# Patient Record
Sex: FEMALE | Race: WHITE | URBAN - METROPOLITAN AREA
[De-identification: names, ages, dates, MRNs, and addresses within clinical notes are randomized per-mention and may not be internally consistent; named-entity substitution may affect disease eponyms.]

---

## 2021-05-24 ENCOUNTER — OFFICE VISIT (OUTPATIENT)
Dept: FAMILY MEDICINE CLINIC | Facility: CLINIC | Age: 36
End: 2021-05-24

## 2021-05-24 VITALS
TEMPERATURE: 99 F | HEIGHT: 66.25 IN | OXYGEN SATURATION: 98 % | WEIGHT: 293 LBS | HEART RATE: 90 BPM | DIASTOLIC BLOOD PRESSURE: 94 MMHG | SYSTOLIC BLOOD PRESSURE: 144 MMHG | RESPIRATION RATE: 18 BRPM | BODY MASS INDEX: 47.09 KG/M2

## 2021-05-24 DIAGNOSIS — Z02.89 ENCOUNTER FOR PHYSICAL EXAMINATION RELATED TO EMPLOYMENT: Primary | ICD-10-CM

## 2021-05-24 DIAGNOSIS — R03.0 ELEVATED BLOOD PRESSURE READING: ICD-10-CM

## 2021-05-24 PROCEDURE — 3077F SYST BP >= 140 MM HG: CPT | Performed by: NURSE PRACTITIONER

## 2021-05-24 PROCEDURE — 3080F DIAST BP >= 90 MM HG: CPT | Performed by: NURSE PRACTITIONER

## 2021-05-24 PROCEDURE — 3008F BODY MASS INDEX DOCD: CPT | Performed by: NURSE PRACTITIONER

## 2021-05-24 PROCEDURE — 99385 PREV VISIT NEW AGE 18-39: CPT | Performed by: NURSE PRACTITIONER

## 2021-05-24 NOTE — PROGRESS NOTES
CHIEF COMPLAINT:     Patient presents with:  Employment Physical      HPI:   Claudene Pounds is a 31year old female who presents for physical exam for pre-employment as a  for [employer].      Patient denies any medical co bruits  CHEST: no chest tenderness  LUNGS: Clear to auscultation bilaterally. No diminished breath sounds. No wheezing, rhonchi, or rales. CARDIO: RRR without murmur  GI: BS's present x4., No tenderness of palpation.   No obvious masses or palpable organom

## 2021-05-24 NOTE — PATIENT INSTRUCTIONS
Establish care with a primary care doctor in the next week for further evaluation of your high blood pressure and full physical with labs. What Is High Blood Pressure?   High blood pressure (hypertension) is known as the “silent killer.” This is becau when the heart contracts. Diastolic blood pressure is the lower number. This is the pressure when the heart relaxes between beats.   Blood pressure is categorized as normal, elevated, or stage 1 or stage 2 high blood pressure:  · Normal blood pressure is sy volume and blood pressure. The American Heart Association [facility]) advises an \"ideal\" amount of sodium: no more than 1,500 mg a day.  But because Americans eat so much salt, the AHA says a positive change can occur by cutting back to even 2,300 mg a day.   · healthcare professional's instructions.

## 2023-04-18 ENCOUNTER — TELEPHONE (OUTPATIENT)
Facility: CLINIC | Age: 38
End: 2023-04-18

## 2023-11-28 ENCOUNTER — OFFICE VISIT (OUTPATIENT)
Dept: FAMILY MEDICINE CLINIC | Facility: CLINIC | Age: 38
End: 2023-11-28
Payer: COMMERCIAL

## 2023-11-28 VITALS
WEIGHT: 293 LBS | RESPIRATION RATE: 16 BRPM | OXYGEN SATURATION: 99 % | TEMPERATURE: 97 F | BODY MASS INDEX: 45.99 KG/M2 | HEART RATE: 98 BPM | SYSTOLIC BLOOD PRESSURE: 150 MMHG | HEIGHT: 67 IN | DIASTOLIC BLOOD PRESSURE: 96 MMHG

## 2023-11-28 DIAGNOSIS — R03.0 ELEVATED BLOOD PRESSURE READING: ICD-10-CM

## 2023-11-28 DIAGNOSIS — J06.9 UPPER RESPIRATORY TRACT INFECTION, UNSPECIFIED TYPE: Primary | ICD-10-CM

## 2023-11-28 PROCEDURE — 3008F BODY MASS INDEX DOCD: CPT | Performed by: NURSE PRACTITIONER

## 2023-11-28 PROCEDURE — 99213 OFFICE O/P EST LOW 20 MIN: CPT | Performed by: NURSE PRACTITIONER

## 2023-11-28 PROCEDURE — 3077F SYST BP >= 140 MM HG: CPT | Performed by: NURSE PRACTITIONER

## 2023-11-28 PROCEDURE — 3080F DIAST BP >= 90 MM HG: CPT | Performed by: NURSE PRACTITIONER

## 2024-05-06 ENCOUNTER — OFFICE VISIT (OUTPATIENT)
Dept: FAMILY MEDICINE CLINIC | Facility: CLINIC | Age: 39
End: 2024-05-06
Payer: COMMERCIAL

## 2024-05-06 VITALS
DIASTOLIC BLOOD PRESSURE: 96 MMHG | WEIGHT: 293 LBS | OXYGEN SATURATION: 98 % | BODY MASS INDEX: 45.99 KG/M2 | RESPIRATION RATE: 18 BRPM | HEIGHT: 67 IN | HEART RATE: 84 BPM | SYSTOLIC BLOOD PRESSURE: 154 MMHG | TEMPERATURE: 99 F

## 2024-05-06 DIAGNOSIS — G47.30 SLEEP DISORDER BREATHING: ICD-10-CM

## 2024-05-06 DIAGNOSIS — Z30.432 ENCOUNTER FOR IUD REMOVAL: ICD-10-CM

## 2024-05-06 DIAGNOSIS — Z23 NEED FOR DIPHTHERIA-TETANUS-PERTUSSIS (TDAP) VACCINE: ICD-10-CM

## 2024-05-06 DIAGNOSIS — Z00.00 WELL ADULT EXAM: Primary | ICD-10-CM

## 2024-05-06 DIAGNOSIS — R01.2 ABNORMAL SECOND HEART SOUND (S2): ICD-10-CM

## 2024-05-06 DIAGNOSIS — Z01.419 CERVICAL SMEAR, AS PART OF ROUTINE GYNECOLOGICAL EXAMINATION: ICD-10-CM

## 2024-05-06 DIAGNOSIS — I10 BENIGN ESSENTIAL HTN: ICD-10-CM

## 2024-05-06 DIAGNOSIS — E66.01 CLASS 3 SEVERE OBESITY DUE TO EXCESS CALORIES WITH BODY MASS INDEX (BMI) OF 45.0 TO 49.9 IN ADULT, UNSPECIFIED WHETHER SERIOUS COMORBIDITY PRESENT (HCC): ICD-10-CM

## 2024-05-06 LAB
ALBUMIN SERPL-MCNC: 3.9 G/DL (ref 3.4–5)
ALBUMIN/GLOB SERPL: 1 {RATIO} (ref 1–2)
ALP LIVER SERPL-CCNC: 61 U/L
ALT SERPL-CCNC: 35 U/L
ANION GAP SERPL CALC-SCNC: 5 MMOL/L (ref 0–18)
AST SERPL-CCNC: 30 U/L (ref 15–37)
ATRIAL RATE: 78 BPM
BASOPHILS # BLD AUTO: 0.06 X10(3) UL (ref 0–0.2)
BASOPHILS NFR BLD AUTO: 0.7 %
BILIRUB SERPL-MCNC: 0.4 MG/DL (ref 0.1–2)
BUN BLD-MCNC: 12 MG/DL (ref 9–23)
CALCIUM BLD-MCNC: 9.8 MG/DL (ref 8.5–10.1)
CHLORIDE SERPL-SCNC: 106 MMOL/L (ref 98–112)
CHOLEST SERPL-MCNC: 248 MG/DL (ref ?–200)
CO2 SERPL-SCNC: 28 MMOL/L (ref 21–32)
CREAT BLD-MCNC: 0.82 MG/DL
CREAT UR-SCNC: 295 MG/DL
EGFRCR SERPLBLD CKD-EPI 2021: 94 ML/MIN/1.73M2 (ref 60–?)
EOSINOPHIL # BLD AUTO: 0.21 X10(3) UL (ref 0–0.7)
EOSINOPHIL NFR BLD AUTO: 2.5 %
ERYTHROCYTE [DISTWIDTH] IN BLOOD BY AUTOMATED COUNT: 13 %
EST. AVERAGE GLUCOSE BLD GHB EST-MCNC: 134 MG/DL (ref 68–126)
FASTING PATIENT LIPID ANSWER: YES
FASTING STATUS PATIENT QL REPORTED: YES
GLOBULIN PLAS-MCNC: 3.8 G/DL (ref 2.8–4.4)
GLUCOSE BLD-MCNC: 117 MG/DL (ref 70–99)
HBA1C MFR BLD: 6.3 % (ref ?–5.7)
HCT VFR BLD AUTO: 41.8 %
HDLC SERPL-MCNC: 44 MG/DL (ref 40–59)
HGB BLD-MCNC: 14.7 G/DL
IMM GRANULOCYTES # BLD AUTO: 0.05 X10(3) UL (ref 0–1)
IMM GRANULOCYTES NFR BLD: 0.6 %
LDLC SERPL CALC-MCNC: 146 MG/DL (ref ?–100)
LYMPHOCYTES # BLD AUTO: 2.13 X10(3) UL (ref 1–4)
LYMPHOCYTES NFR BLD AUTO: 25.7 %
MCH RBC QN AUTO: 30.7 PG (ref 26–34)
MCHC RBC AUTO-ENTMCNC: 35.2 G/DL (ref 31–37)
MCV RBC AUTO: 87.3 FL
MICROALBUMIN UR-MCNC: 227 MG/DL
MICROALBUMIN/CREAT 24H UR-RTO: 769.5 UG/MG (ref ?–30)
MONOCYTES # BLD AUTO: 0.51 X10(3) UL (ref 0.1–1)
MONOCYTES NFR BLD AUTO: 6.1 %
NEUTROPHILS # BLD AUTO: 5.34 X10 (3) UL (ref 1.5–7.7)
NEUTROPHILS # BLD AUTO: 5.34 X10(3) UL (ref 1.5–7.7)
NEUTROPHILS NFR BLD AUTO: 64.4 %
NONHDLC SERPL-MCNC: 204 MG/DL (ref ?–130)
OSMOLALITY SERPL CALC.SUM OF ELEC: 289 MOSM/KG (ref 275–295)
P AXIS: 39 DEGREES
P-R INTERVAL: 150 MS
PLATELET # BLD AUTO: 273 10(3)UL (ref 150–450)
POTASSIUM SERPL-SCNC: 4.1 MMOL/L (ref 3.5–5.1)
PROT SERPL-MCNC: 7.7 G/DL (ref 6.4–8.2)
Q-T INTERVAL: 386 MS
QRS DURATION: 86 MS
QTC CALCULATION (BEZET): 440 MS
R AXIS: 15 DEGREES
RBC # BLD AUTO: 4.79 X10(6)UL
SODIUM SERPL-SCNC: 139 MMOL/L (ref 136–145)
T AXIS: 25 DEGREES
T4 FREE SERPL-MCNC: 0.7 NG/DL (ref 0.8–1.7)
TRIGL SERPL-MCNC: 314 MG/DL (ref 30–149)
TSI SER-ACNC: 2.32 MIU/ML (ref 0.36–3.74)
VENTRICULAR RATE: 78 BPM
VLDLC SERPL CALC-MCNC: 60 MG/DL (ref 0–30)
WBC # BLD AUTO: 8.3 X10(3) UL (ref 4–11)

## 2024-05-06 PROCEDURE — 80061 LIPID PANEL: CPT | Performed by: FAMILY MEDICINE

## 2024-05-06 PROCEDURE — 84443 ASSAY THYROID STIM HORMONE: CPT | Performed by: FAMILY MEDICINE

## 2024-05-06 PROCEDURE — 84439 ASSAY OF FREE THYROXINE: CPT | Performed by: FAMILY MEDICINE

## 2024-05-06 PROCEDURE — 85025 COMPLETE CBC W/AUTO DIFF WBC: CPT | Performed by: FAMILY MEDICINE

## 2024-05-06 PROCEDURE — 83036 HEMOGLOBIN GLYCOSYLATED A1C: CPT | Performed by: FAMILY MEDICINE

## 2024-05-06 PROCEDURE — 82043 UR ALBUMIN QUANTITATIVE: CPT | Performed by: FAMILY MEDICINE

## 2024-05-06 PROCEDURE — 82570 ASSAY OF URINE CREATININE: CPT | Performed by: FAMILY MEDICINE

## 2024-05-06 PROCEDURE — 80053 COMPREHEN METABOLIC PANEL: CPT | Performed by: FAMILY MEDICINE

## 2024-05-06 RX ORDER — LISINOPRIL 10 MG/1
10 TABLET ORAL DAILY
Qty: 30 TABLET | Refills: 0 | Status: SHIPPED | OUTPATIENT
Start: 2024-05-06 | End: 2025-05-01

## 2024-05-06 NOTE — PROGRESS NOTES
Chief Complaint   Patient presents with    Care Gap Management     Annual px  Pap smear  Tdap    Well Adult     Pt would like to discuss blood pressure, weight management, women's health, pain in right arm that's been occurring for a month now, and IUD that was placed in 2012, Pt is fasting         HPI  Patient is here to establish care.  She has not had a physical in several years and she has an IUD that was placed in 2012 and she requested gynecological referral to receive a Pap and have this removed.  She is in a single sex relationship and no longer requires birth control.    Patient also struggling with her blood pressure and weight and is agreeable to getting EKG and blood work today as she is fasting. She agrees to Tdap and for weight mmgt referral    She dneis CP/SOB/N/V/constipation or diarrhea. She denies tingling numbness joint pain or weakness.  She does complain of nonrestorative sleep and sleep disordered breathing.  Her partner is having to sleep away from her due to her snoring    ROS  As per HPI and all other systems reviewed and are negative      No past medical history on file.    Past Surgical History:   Procedure Laterality Date    Tonsillectomy         Social History     Socioeconomic History    Marital status:    Tobacco Use    Smoking status: Former     Types: Cigarettes    Smokeless tobacco: Never    Tobacco comments:     Quit 10 years ago   Substance and Sexual Activity    Alcohol use: Yes     Comment: socially 1x/ month    Drug use: Never       No family history on file.     No current outpatient medications on file prior to visit.     No current facility-administered medications on file prior to visit.         Objective  Vitals:    05/06/24 0721   BP: (!) 154/96   Pulse: 84   Resp: 18   Temp: 98.6 °F (37 °C)   SpO2: 98%   Weight: (!) 319 lb 6.4 oz (144.9 kg)   Height: 5' 7\" (1.702 m)     Physical Exam  Constitutional:       Appearance: Normal appearance.   HEENT:      Head:  Normocephalic and atraumatic.      Eyes: PERRLA no notable nystagmus     Ears: normal on observation     Nose: Nose normal.      Mouth: Mucous membranes are moist.      Neck: no masses no bruit  Cardiovascular:      Rate and Rhythm: Normal rate and regular rhythm.   Pulmonary:      Effort: Pulmonary effort is normal.      Breath sounds: Normal breath sounds.   Abdominal:      General: Bowel sounds are normal.      Palpations: Abdomen is soft. There is no mass.   Musculoskeletal:         General: Normal range of motion.      Cervical back: Normal range of motion.   Skin:     General: Skin is warm and dry.   Neurological:      General: No focal deficit present.      Mental Status: She is alert and oriented to person, place, and time.   Psychiatric:         Mood and Affect: Mood normal.         Thought Content: Thought content normal.       Assessment and Plan  Samara was seen today for care gap management and well adult.    Diagnoses and all orders for this visit:    Well adult exam  -     CBC With Differential With Platelet; Future  -     Comp Metabolic Panel (14); Future  -     TSH and Free T4; Future  -     Lipid Panel; Future  -     CBC With Differential With Platelet  -     Comp Metabolic Panel (14)  -     TSH and Free T4  -     Lipid Panel    Class 3 severe obesity due to excess calories with body mass index (BMI) of 45.0 to 49.9 in adult, unspecified whether serious comorbidity present (HCC)  -     Hemoglobin A1C [E]; Future  -     [facility] - [place] Location  -     Hemoglobin A1C [E]    Need for diphtheria-tetanus-pertussis (Tdap) vaccine  -     TdaP (Adacel, Boostrix) [17410]    Benign essential HTN  -     Microalb/Creat Ratio, Random Urine [E]; Future  -     EKG with interpretation and Report -IN OFFICE [93145]  -     Microalb/Creat Ratio, Random Urine [E]  -     CARD ECHO 2D DOPPLER (CPT=93306); Future  -     lisinopril 10 MG Oral Tab; Take 1 tablet (10 mg total) by mouth  daily.    Encounter for IUD removal  -     OBG Referral - [place] ([place])    Cervical smear, as part of routine gynecological examination  -     [facility]G Referral - [place] ([facility])    Sleep disorder breathing  -     Sleep Consult Only - Internal ([facility])    Abnormal second heart sound (S2)  -     CARD ECHO 2D DOPPLER (CPT=93306); Future           Follow up  3 weeks for BP check and repeat BMP      Patient Instructions  There are no Patient Instructions on file for this visit.       Belinda Day MD

## 2024-05-07 ENCOUNTER — TELEPHONE (OUTPATIENT)
Dept: FAMILY MEDICINE CLINIC | Facility: CLINIC | Age: 39
End: 2024-05-07

## 2024-05-07 NOTE — TELEPHONE ENCOUNTER
Attempted to contact patient, unable to reach her- left a voicemail per her verbal.     In order to request her medical records for a pap, our office needs to know which offices specifically she visited and their phone numbers. I instructed the patient to call our office at [phone].    Currently awaiting return call.

## 2024-05-08 ENCOUNTER — TELEMEDICINE (OUTPATIENT)
Dept: FAMILY MEDICINE CLINIC | Facility: CLINIC | Age: 39
End: 2024-05-08
Payer: COMMERCIAL

## 2024-05-08 DIAGNOSIS — E66.01 CLASS 3 SEVERE OBESITY DUE TO EXCESS CALORIES WITH BODY MASS INDEX (BMI) OF 45.0 TO 49.9 IN ADULT, UNSPECIFIED WHETHER SERIOUS COMORBIDITY PRESENT (HCC): ICD-10-CM

## 2024-05-08 DIAGNOSIS — E78.2 MIXED HYPERLIPIDEMIA: ICD-10-CM

## 2024-05-08 DIAGNOSIS — R73.02 IGT (IMPAIRED GLUCOSE TOLERANCE): ICD-10-CM

## 2024-05-08 DIAGNOSIS — I10 BENIGN ESSENTIAL HTN: Primary | ICD-10-CM

## 2024-05-08 PROCEDURE — 99214 OFFICE O/P EST MOD 30 MIN: CPT | Performed by: FAMILY MEDICINE

## 2024-05-08 RX ORDER — METFORMIN HYDROCHLORIDE 500 MG/1
500 TABLET, EXTENDED RELEASE ORAL DAILY
Qty: 30 TABLET | Refills: 2 | Status: SHIPPED | OUTPATIENT
Start: 2024-05-08

## 2024-05-08 RX ORDER — ATORVASTATIN CALCIUM 10 MG/1
10 TABLET, FILM COATED ORAL DAILY
Qty: 30 TABLET | Refills: 2 | Status: SHIPPED | OUTPATIENT
Start: 2024-05-08 | End: 2025-05-03

## 2024-05-08 RX ORDER — BLOOD PRESSURE TEST KIT
KIT MISCELLANEOUS
Qty: 1 KIT | Refills: 0 | Status: SHIPPED | OUTPATIENT
Start: 2024-05-08

## 2024-05-08 NOTE — PROGRESS NOTES
Telehealth outside of [facility]  Telehealth Verbal Consent   I conducted a telehealth visit with Samara Jade today, 05/08/24, which was completed using two-way, real-time interactive audio and video communication. This has been done in good james to provide continuity of care in the best interest of the provider-patient relationship, due to the COVID -19 public health crisis/national emergency where restrictions of face-to-face office visits are ongoing. Every conscious effort was taken to allow for sufficient and adequate time to complete the visit.  The patient was made aware of the limitations of the telehealth visit, including treatment limitations as no physical exam could be performed.  The patient was advised to call 911 or to go to the ER in case there was an emergency.  The patient was also advised of the potential privacy & security concerns related to the telehealth platform.   The patient was made aware of where to find [facility]'s notice of privacy practices, telehealth consent form and other related consent forms and documents.  which are located on the [facility] website. The patient verbally agreed to telehealth consent form, related consents and the risks discussed.    Lastly, the patient confirmed that they were in [place].   Included in this visit, time may have been spent reviewing labs, medications, radiology tests and decision making. Appropriate medical decision-making and tests are ordered as detailed in the plan of care above.  Coding/billing information is submitted for this visit based on complexity of care and/or time spent for the visit.    Samara Jade is a 39 year old female.    Chief Complaint   Patient presents with    Follow - Up     Labs         HPI:   Patient is here for follow up of recent labs, Informed of elevated lipid and prediabetes as well as microalbuminuria. She has not been monitoring her BP but will do so. She is agreeable to starting statin therapy and metformin to  help with glucose mmgt and weight. She is also agreeable to referral to dietician for obesity mmgt. Meds to be started 2 weeks apart and pt to review med SE and call with questions     There is no problem list on file for this patient.    Current Outpatient Medications   Medication Sig Dispense Refill    Blood Pressure Does not apply Kit Use daily as needed 1 kit 0    metFORMIN  MG Oral Tablet 24 Hr Take 1 tablet (500 mg total) by mouth daily. 30 tablet 2    atorvastatin (LIPITOR) 10 MG Oral Tab Take 1 tablet (10 mg total) by mouth daily. 30 tablet 2    lisinopril 10 MG Oral Tab Take 1 tablet (10 mg total) by mouth daily. 30 tablet 0      No past medical history on file.   Social History:  Social History     Socioeconomic History    Marital status:    Tobacco Use    Smoking status: Former     Types: Cigarettes    Smokeless tobacco: Never    Tobacco comments:     Quit 10 years ago   Substance and Sexual Activity    Alcohol use: Yes     Comment: socially 1x/ month    Drug use: Never     No family history on file.     Allergies  Allergies   Allergen Reactions    Sulfa Antibiotics OTHER (SEE COMMENTS)     Pt stated her mother had told her this when she was young and is unaware if this allergy is true       REVIEW OF SYSTEMS:   As per HPI all other systems are negative    EXAM:   GENERAL: well developed, well nourished,in no apparent distress. Pt is speaking in full sentences without any cognitive impairment. Further physical exam could not be performed due to interview being conducted over telemedicine medium      ASSESSMENT AND PLAN:     Encounter Diagnoses   Name Primary?    Benign essential HTN Yes    IGT (impaired glucose tolerance)     Mixed hyperlipidemia     Class 3 severe obesity due to excess calories with body mass index (BMI) of 45.0 to 49.9 in adult, unspecified whether serious comorbidity present (HCC)        No orders of the defined types were placed in this encounter.      Meds & Refills for  this Visit:  Requested Prescriptions     Signed Prescriptions Disp Refills    Blood Pressure Does not apply Kit 1 kit 0     Sig: Use daily as needed    metFORMIN  MG Oral Tablet 24 Hr 30 tablet 2     Sig: Take 1 tablet (500 mg total) by mouth daily.    atorvastatin (LIPITOR) 10 MG Oral Tab 30 tablet 2     Sig: Take 1 tablet (10 mg total) by mouth daily.       Imaging & Consults:  DIETITIAN EDUCATION INITIAL, DIET (INTERNAL)    Return in about 3 months (around 8/8/2024) for labs and med mmgt.  There are no Patient Instructions on file for this visit.

## 2024-05-28 ENCOUNTER — OFFICE VISIT (OUTPATIENT)
Dept: FAMILY MEDICINE CLINIC | Facility: CLINIC | Age: 39
End: 2024-05-28

## 2024-05-28 VITALS
BODY MASS INDEX: 50 KG/M2 | OXYGEN SATURATION: 97 % | HEART RATE: 84 BPM | DIASTOLIC BLOOD PRESSURE: 84 MMHG | TEMPERATURE: 98 F | WEIGHT: 293 LBS | SYSTOLIC BLOOD PRESSURE: 142 MMHG | RESPIRATION RATE: 22 BRPM

## 2024-05-28 DIAGNOSIS — I10 BENIGN ESSENTIAL HTN: Primary | ICD-10-CM

## 2024-05-28 LAB
ANION GAP SERPL CALC-SCNC: 6 MMOL/L (ref 0–18)
BUN BLD-MCNC: 12 MG/DL (ref 9–23)
CALCIUM BLD-MCNC: 9.5 MG/DL (ref 8.5–10.1)
CHLORIDE SERPL-SCNC: 106 MMOL/L (ref 98–112)
CO2 SERPL-SCNC: 25 MMOL/L (ref 21–32)
CREAT BLD-MCNC: 0.96 MG/DL
CREAT UR-SCNC: 242 MG/DL
EGFRCR SERPLBLD CKD-EPI 2021: 78 ML/MIN/1.73M2 (ref 60–?)
FASTING STATUS PATIENT QL REPORTED: NO
GLUCOSE BLD-MCNC: 105 MG/DL (ref 70–99)
MICROALBUMIN UR-MCNC: 141 MG/DL
MICROALBUMIN/CREAT 24H UR-RTO: 582.6 UG/MG (ref ?–30)
OSMOLALITY SERPL CALC.SUM OF ELEC: 284 MOSM/KG (ref 275–295)
POTASSIUM SERPL-SCNC: 4.2 MMOL/L (ref 3.5–5.1)
SODIUM SERPL-SCNC: 137 MMOL/L (ref 136–145)

## 2024-05-28 PROCEDURE — 82570 ASSAY OF URINE CREATININE: CPT | Performed by: FAMILY MEDICINE

## 2024-05-28 PROCEDURE — 82043 UR ALBUMIN QUANTITATIVE: CPT | Performed by: FAMILY MEDICINE

## 2024-05-28 PROCEDURE — 3079F DIAST BP 80-89 MM HG: CPT | Performed by: FAMILY MEDICINE

## 2024-05-28 PROCEDURE — 80048 BASIC METABOLIC PNL TOTAL CA: CPT | Performed by: FAMILY MEDICINE

## 2024-05-28 PROCEDURE — 99213 OFFICE O/P EST LOW 20 MIN: CPT | Performed by: FAMILY MEDICINE

## 2024-05-28 PROCEDURE — 3077F SYST BP >= 140 MM HG: CPT | Performed by: FAMILY MEDICINE

## 2024-05-28 RX ORDER — LISINOPRIL 20 MG/1
20 TABLET ORAL DAILY
Qty: 30 TABLET | Refills: 0 | Status: SHIPPED | OUTPATIENT
Start: 2024-05-28 | End: 2025-05-23

## 2024-05-28 NOTE — PROGRESS NOTES
Chief Complaint   Patient presents with    Follow - Up     4W follow up to check blood pressure.         HPI  Pt is here for follow up on medication and BP. She has no concerns and her BP is better but still high. She is agreeable to increasing the dose of lisinopril and is being more active    ROS  As per HPI and all other systems reviewed and are negative      History reviewed. No pertinent past medical history.    Past Surgical History:   Procedure Laterality Date    Tonsillectomy         Social History     Socioeconomic History    Marital status:    Tobacco Use    Smoking status: Former     Types: Cigarettes    Smokeless tobacco: Never    Tobacco comments:     Quit 10 years ago   Vaping Use    Vaping status: Former   Substance and Sexual Activity    Alcohol use: Yes     Comment: socially 1x/ month    Drug use: Never       History reviewed. No pertinent family history.     Current Outpatient Medications on File Prior to Visit   Medication Sig Dispense Refill    Blood Pressure Does not apply Kit Use daily as needed 1 kit 0    metFORMIN  MG Oral Tablet 24 Hr Take 1 tablet (500 mg total) by mouth daily. 30 tablet 2    atorvastatin (LIPITOR) 10 MG Oral Tab Take 1 tablet (10 mg total) by mouth daily. 30 tablet 2     No current facility-administered medications on file prior to visit.         Objective  Vitals:    05/28/24 0734   BP: 142/84   Pulse: 84   Resp: 22   Temp: 97.9 °F (36.6 °C)   SpO2: 97%   Weight: (!) 319 lb (144.7 kg)     Physical Exam  Constitutional:       Appearance: Normal appearance.   HEENT:      Head: Normocephalic and atraumatic.      Eyes: PERRLA no notable nystagmus     Ears: normal on observation     Nose: Nose normal.      Mouth: Mucous membranes are moist.      Neck: no masses no bruit  Cardiovascular:      Rate and Rhythm: Normal rate and regular rhythm.   Pulmonary:      Effort: Pulmonary effort is normal.      Breath sounds: Normal breath sounds.   Abdominal:      General:  Bowel sounds are normal.      Palpations: Abdomen is soft. There is no mass.   Musculoskeletal:         General: Normal range of motion.      Cervical back: Normal range of motion.   Skin:     General: Skin is warm and dry.   Neurological:      General: No focal deficit present.      Mental Status: She is alert and oriented to person, place, and time.   Psychiatric:         Mood and Affect: Mood normal.         Thought Content: Thought content normal.       Assessment and Plan  Samara was seen today for follow - up.    Diagnoses and all orders for this visit:    Benign essential HTN  -     lisinopril 20 MG Oral Tab; Take 1 tablet (20 mg total) by mouth daily.  -     Basic Metabolic Panel (8) [E]; Future  -     Microalb/Creat Ratio, Random Urine [E]; Future  -     Basic Metabolic Panel (8) [E]  -     Microalb/Creat Ratio, Random Urine [E]           Follow up  Return in about 3 months (around 8/28/2024) for well ness with labs.      Patient Instructions  There are no Patient Instructions on file for this visit.       Belinda Day MD

## 2024-06-15 DIAGNOSIS — I10 BENIGN ESSENTIAL HTN: ICD-10-CM

## 2024-06-15 NOTE — TELEPHONE ENCOUNTER
Pt failed refill protocol for the following reasons:    Hypertension Medications Protocol Vfsegy63/15/2024 07:47 AM   Protocol Details Last BP reading less than 140/90        *Please see message from patient*    Last refill: 5/28/2024 #30 with 0 refills  Last appt: 5/28/2024  Next appt:   Future Appointments   Date Time Provider Department Center   8/28/2024  7:20 AM Belinda Day MD [facility] [place]         Forward to Dr. Cano, please advise on refills. Thank you.

## 2024-06-17 RX ORDER — LISINOPRIL 20 MG/1
20 TABLET ORAL DAILY
Qty: 30 TABLET | Refills: 0 | Status: SHIPPED | OUTPATIENT
Start: 2024-06-17 | End: 2025-06-12

## 2024-06-19 DIAGNOSIS — I10 BENIGN ESSENTIAL HTN: ICD-10-CM

## 2024-06-19 RX ORDER — LISINOPRIL 20 MG/1
20 TABLET ORAL DAILY
Qty: 30 TABLET | Refills: 0 | Status: SHIPPED | OUTPATIENT
Start: 2024-06-19

## 2024-06-19 NOTE — TELEPHONE ENCOUNTER
Rx resent    Sent to pharmacy as: Lisinopril 20 MG Oral Tablet (Prinivil; Zestril)    E-Prescribing Status: Receipt confirmed by pharmacy (6/17/2024  9:47 AM CDT)

## 2024-07-31 DIAGNOSIS — E78.2 MIXED HYPERLIPIDEMIA: ICD-10-CM

## 2024-07-31 DIAGNOSIS — R73.02 IGT (IMPAIRED GLUCOSE TOLERANCE): ICD-10-CM

## 2024-07-31 RX ORDER — ATORVASTATIN CALCIUM 10 MG/1
10 TABLET, FILM COATED ORAL DAILY
Qty: 30 TABLET | Refills: 0 | Status: SHIPPED | OUTPATIENT
Start: 2024-07-31

## 2024-07-31 RX ORDER — METFORMIN HYDROCHLORIDE 500 MG/1
500 TABLET, EXTENDED RELEASE ORAL DAILY
Qty: 30 TABLET | Refills: 0 | Status: SHIPPED | OUTPATIENT
Start: 2024-07-31

## 2024-07-31 NOTE — TELEPHONE ENCOUNTER
Diabetes Medication Protocol Yjywue3007/31/2024 05:32 AM   Protocol Details Last A1C < 7.5 and within past 6 months    In person appointment or virtual visit in the past 6 mos or appointment in next 3 mos    Microalbumin procedure in past 12 months or taking ACE/ARB    EGFRCR or GFRNAA > 50    GFR in the past 12 months      Refilled per protocol  metFORMIN  MG Oral Tablet 24 Hr   Last refilled on 5/8/24 #30 with 2 rf.  LOV- 5/28/24  Last labs- 5/28/24    Sent to pharmacy    Refilled per protocol  atorvastatin (LIPITOR) 10 MG Oral Tab   Last refilled on 5/8/24 #30 with 2 rf.  Sent to pharmacy

## 2024-08-14 DIAGNOSIS — R73.02 IGT (IMPAIRED GLUCOSE TOLERANCE): ICD-10-CM

## 2024-08-14 DIAGNOSIS — I10 BENIGN ESSENTIAL HTN: ICD-10-CM

## 2024-08-14 DIAGNOSIS — E78.2 MIXED HYPERLIPIDEMIA: ICD-10-CM

## 2024-08-14 RX ORDER — LISINOPRIL 20 MG/1
20 TABLET ORAL DAILY
Qty: 30 TABLET | Refills: 0 | Status: SHIPPED | OUTPATIENT
Start: 2024-08-14 | End: 2024-08-14

## 2024-08-14 RX ORDER — LISINOPRIL 20 MG/1
20 TABLET ORAL DAILY
Qty: 30 TABLET | Refills: 0 | Status: SHIPPED | OUTPATIENT
Start: 2024-08-14

## 2024-08-14 RX ORDER — ATORVASTATIN CALCIUM 10 MG/1
10 TABLET, FILM COATED ORAL DAILY
Qty: 30 TABLET | Refills: 0 | Status: SHIPPED | OUTPATIENT
Start: 2024-08-14

## 2024-08-14 RX ORDER — METFORMIN HYDROCHLORIDE 500 MG/1
500 TABLET, EXTENDED RELEASE ORAL DAILY
Qty: 30 TABLET | Refills: 0 | Status: SHIPPED | OUTPATIENT
Start: 2024-08-14

## 2024-08-14 NOTE — TELEPHONE ENCOUNTER
Diabetes Medication Protocol Vupgfx6908/14/2024 09:14 AM   Protocol Details Last A1C < 7.5 and within past 6 months    In person appointment or virtual visit in the past 6 mos or appointment in next 3 mos    Microalbumin procedure in past 12 months or taking ACE/ARB    EGFRCR or GFRNAA > 50    GFR in the past 12 months   Refilled per protocol  ATORVASTATIN 10 MG Oral Tab   Last refilled on 7/31/24 #30 with 0 rf.  LOV- 5/28/24  Last wellness-5/6/24  Last labs- 5/28/24    Sent to provider.     Refilled per protocol  METFORMIN  MG Oral Tablet 24 Hr   Last refilled on 7/31/24 #30 with 0 rf.  Sent to provider.     Routing to provider per protocol.   LISINOPRIL 20 MG Oral Tab   Last refilled on 8/14/24 for #30  with 0 rf.     Future Appointments   Date Time Provider Department Center   8/28/2024  7:20 AM Belinda Day MD [facility] [place]          Thank you.

## 2024-08-14 NOTE — TELEPHONE ENCOUNTER
Hypertension Medications Protocol Ffcpva6208/14/2024 09:13 AM   Protocol Details Last BP reading less than 140/90    CMP or BMP in past 12 months    In person appointment or virtual visit in the past 12 mos or appointment in next 3 mos    EGFRCR or GFRNAA > 50      Routing to provider per protocol.   lisinopril 20 MG Oral Tab   Last refilled on 6/19/24 for #30  with 0 rf.   Last labs 5/28/24.   Last seen on 5/28/24  Last wellness-5/6/24.       Future Appointments   Date Time Provider Department Center   8/28/2024  7:20 AM Belinda Day MD [facility] [place]          Thank you.

## 2024-08-26 ENCOUNTER — TELEPHONE (OUTPATIENT)
Dept: FAMILY MEDICINE CLINIC | Facility: CLINIC | Age: 39
End: 2024-08-26

## 2024-08-26 DIAGNOSIS — I10 BENIGN ESSENTIAL HTN: Primary | ICD-10-CM

## 2024-08-26 NOTE — TELEPHONE ENCOUNTER
Per Dr. Cano: Ok to wait to see me as long as BP ok and she is doing ok. Thank you     Improvement in proteinuria. Repeat again once BP under control   Written by Belinda Day MD on 5/29/2024  7:47 AM CDT  Seen by patient Samara Jade on 8/11/2024  6:26 PM    Future Appointments   Date Time Provider Department Center   8/30/2024  8:45 AM [facility] [place]     Microalb urine order placed    Left detailed message to voicemail (per verbal release form consent with confirmed identifying message) of Dr. Cano's note above, advised will repeat microalb urine if BP WNL. Patient was advised to call office back with any questions/concerns.

## 2024-08-26 NOTE — TELEPHONE ENCOUNTER
Patient was scheduled for a visit on Wednesday to follow up on labs and blood pressure    She is unable to reschedule due to work schedule for a few weeks    She did schedule a nurse visit on Friday to have blood pressure checked for now    She wanted pcp to be aware    Please adv  Thank you

## 2024-08-30 ENCOUNTER — NURSE ONLY (OUTPATIENT)
Dept: FAMILY MEDICINE CLINIC | Facility: CLINIC | Age: 39
End: 2024-08-30
Payer: COMMERCIAL

## 2024-08-30 VITALS — DIASTOLIC BLOOD PRESSURE: 80 MMHG | SYSTOLIC BLOOD PRESSURE: 120 MMHG

## 2024-08-30 DIAGNOSIS — I10 BENIGN ESSENTIAL HTN: ICD-10-CM

## 2024-08-30 LAB
CREAT UR-SCNC: 130.9 MG/DL
MICROALBUMIN UR-MCNC: 87.9 MG/DL
MICROALBUMIN/CREAT 24H UR-RTO: 671.5 UG/MG (ref ?–30)

## 2024-08-30 PROCEDURE — 3079F DIAST BP 80-89 MM HG: CPT | Performed by: FAMILY MEDICINE

## 2024-08-30 PROCEDURE — 3074F SYST BP LT 130 MM HG: CPT | Performed by: FAMILY MEDICINE

## 2024-08-30 PROCEDURE — 82570 ASSAY OF URINE CREATININE: CPT | Performed by: FAMILY MEDICINE

## 2024-08-30 PROCEDURE — 82043 UR ALBUMIN QUANTITATIVE: CPT | Performed by: FAMILY MEDICINE

## 2024-08-30 RX ORDER — LISINOPRIL 20 MG/1
20 TABLET ORAL DAILY
Qty: 90 TABLET | Refills: 0 | Status: SHIPPED | OUTPATIENT
Start: 2024-08-30 | End: 2024-11-28

## 2024-08-30 NOTE — PROGRESS NOTES
Samara Jade present in office for nurse visit.  BP check  Urine sample obtained    Pt reports taking lisinopril 20 mg once daily  Takes BP with wrist BP machine at home, BPs running 130s/80s    Pt sitting, manual BP check  Right arm: 120/80     Pt needing refill for lisinopril   Pt to reschedule follow-up with Dr. Cano as appt on 08/28/24 needed to be rescheduled    All questions/concerns addressed. Patient left in stable condition.

## 2024-08-30 NOTE — TELEPHONE ENCOUNTER
Please see Nurse Visit appt for BP check 08/30/24    BP Readings from Last 3 Encounters:   08/30/24 120/80   05/28/24 142/84   05/06/24 (!) 154/96     LOV 05/28/24  Last well adult 05/06/24  Last labs 05/28/24  Last refill on 08/14/24, for #30 tabs, with 0 refills  lisinopril 20 MG Oral Tab     No future appointments.    Order(s) pending, please review. Thank you.  [facility] [place]

## 2024-09-05 ENCOUNTER — PATIENT MESSAGE (OUTPATIENT)
Dept: FAMILY MEDICINE CLINIC | Facility: CLINIC | Age: 39
End: 2024-09-05

## 2024-09-05 DIAGNOSIS — R80.9 PROTEINURIA, UNSPECIFIED TYPE: Primary | ICD-10-CM

## 2024-09-06 NOTE — TELEPHONE ENCOUNTER
Please apologize to patient and let her know that I am referring her to the nephrologist.  I apologize for the error in my message due to using voice recognition to dictate the note.  Thank you

## 2024-09-06 NOTE — TELEPHONE ENCOUNTER
Please let patient know that she is spilling significant amount of protein in her urine and I would recommend she see the next dose.  Please check if okay to place referral.  Thank you   Written by Belinda Day MD on 8/30/2024 11:43 PM CDT  Seen by patient Samara Jade on 9/4/2024  6:57 AM      Please see pt's MCM  Please advise, thank you

## 2024-09-06 NOTE — TELEPHONE ENCOUNTER
From: Samara Jade  To: Belinda Day  Sent: 9/5/2024 8:55 PM CDT  Subject: Protein Urinalysis Results    Physician reviewed results, not sure what to do with the comments about increase dose and referral. No follow-up has been provided to clarify. Please advise if I’m supposed to be getting a change in a medication or a referral somewhere.

## 2024-10-10 ENCOUNTER — TELEPHONE (OUTPATIENT)
Dept: FAMILY MEDICINE CLINIC | Facility: CLINIC | Age: 39
End: 2024-10-10

## 2024-10-10 DIAGNOSIS — R73.02 IGT (IMPAIRED GLUCOSE TOLERANCE): ICD-10-CM

## 2024-10-10 DIAGNOSIS — E78.2 MIXED HYPERLIPIDEMIA: ICD-10-CM

## 2024-10-10 RX ORDER — METFORMIN HCL 500 MG
500 TABLET, EXTENDED RELEASE 24 HR ORAL DAILY
Qty: 30 TABLET | Refills: 0 | Status: SHIPPED | OUTPATIENT
Start: 2024-10-10

## 2024-10-10 RX ORDER — ATORVASTATIN CALCIUM 10 MG/1
10 TABLET, FILM COATED ORAL DAILY
Qty: 30 TABLET | Refills: 0 | Status: SHIPPED | OUTPATIENT
Start: 2024-10-10

## 2024-10-10 NOTE — TELEPHONE ENCOUNTER
metFORMIN HCl  MG Oral Tablet Extended Release 24 Hour   Last refill: 8/14/2024, for #30, 0 refill  Diabetes Medication Protocol Pyztlo11/10/2024 05:32 AM   Protocol Details Last A1C < 7.5 and within past 6 months    In person appointment or virtual visit in the past 6 mos or appointment in next 3 mos    Microalbumin procedure in past 12 months or taking ACE/ARB    EGFRCR or GFRNAA > 50    GFR in the past 12 months     Atorvastatin Calcium 10 MG Oral Tablet   Last refill: 8/14/2024, for #30, 0 refill  Cholesterol Medication Protocol Ejkmiv00/10/2024 05:32 AM   Protocol Details ALT < 80    ALT resulted within past year    Lipid panel within past 12 months    In person appointment or virtual visit in the past 12 mos or appointment in next 3 mos     LOV 5/28/2024  Last Px: 5/6/2024  Last labs 5/6/2024    Future Appointments   Date Time Provider Department Center   11/12/2024  2:20 PM Seferino Powers MD [id number] [address]

## 2024-10-15 ENCOUNTER — TELEPHONE (OUTPATIENT)
Dept: FAMILY MEDICINE CLINIC | Facility: CLINIC | Age: 39
End: 2024-10-15

## 2024-10-15 NOTE — TELEPHONE ENCOUNTER
Spoke with patient, will call OB office to see who she can see sooner. Patient will call back with provider name and new referral will need to be placed.

## 2024-10-15 NOTE — TELEPHONE ENCOUNTER
Patient Education     Allow the child to rest, push fluids, take the cefdinir once daily for the next 7 days, alternate Tylenol every 4 hours and Motrin every 6 hours as needed for discomfort/fevers, suction out nasal secretions with a bulb syringe, use a cool mist humidifier at the bedside, and keep the child home for 10 days from the onset of symptoms.  Follow-up with the pediatrician if symptoms persist or worsen.  If the child has any difficulty breathing, lethargy, profuse vomiting, or does not urinate in 8-10 hours then taken to the emergency department.  Acute Otitis Media with Infection (Child)    Your child has a middle ear infection (acute otitis media). It's caused by bacteria or viruses. The middle ear is the space behind the eardrum. The eustachian tube connects the ear to the nasal passage. The eustachian tubes help drain fluid from the ears. They also keep the air pressure equal inside and outside the ears. These tubes are shorter and more horizontal in children. This makes it more likely for the tubes to become blocked. A blockage lets fluid and pressure build up in the middle ear. Bacteria or fungi can grow in this fluid and cause an ear infection. This infection is commonly known as an earache.   The main symptom of an ear infection is ear pain. Other symptoms may include pulling at the ear, being more fussy than usual, fever, decreased appetite, and vomiting or diarrhea. Your child’s hearing may also be affected. Your child may have had a respiratory infection first.   An ear infection may clear up on its own. Or your child may need to take medicine. After the infection goes away, your child may still have fluid in the middle ear. It may take weeks or months for this fluid to go away. During that time, your child may have temporary hearing loss. But all other symptoms of the earache should be gone.   Home care  Follow these guidelines when caring for your child at home:  · The healthcare  PATIENT CALLING THIS MORNING. SHE SAYS SHE WAS REFERRED TO GYN AT [place].  DR ELMA GAVIRIA IS ON DAT.  SHOULD HAVE BEEN BACK IN OCTOBER, BUT NOT YET.  EXPECTED APPOINTMENT WHEN DR GAVIRAI RETURNS IS SOMETIME IN DECEMBER.  PRIYA ASKING IF SHE CAN SEE A PARTNER (IF SHE HAS ONE).  NOT SURE IF REFERRAL WOULD BE GOOD WITH ANOTHER PROVIDER BUT SAME FACILITY.  PATIENT ALSO ASKING IF DR SWEENEY NEEDS HER TO HAVE ANY FOLLOW UP BLOOD WORK.   provider will likely prescribe medicines for pain. The provider may also prescribe antibiotics to treat the infection. These may be liquid medicines to give by mouth. Or they may be ear drops. Follow the provider’s instructions for giving these medicines to your child.  Don't give your child any other medicine without first asking your child's healthcare provider, especially the first time.  · Because ear infections can clear up on their own, the provider may suggest waiting for a few days before giving your child medicines for infection.  · To reduce pain, have your child rest in an upright position. Hot or cold compresses held against the ear may help ease pain.  · Don't smoke in the house or around your child. Keep your child away from secondhand smoke.  To help prevent future infections:  · Don't smoke near your child. Secondhand smoke raises the risk for ear infections in children.  · Make sure your child gets all appropriate vaccines.  · Don't bottle-feed while your baby is lying on his or her back. (This position can cause middle ear infections because it allows milk to run into the eustachian tubes.)      · If you breastfeed, continue until your child is 6 to 12 months of age.  To apply ear drops:  1. Put the bottle in warm water if the medicine is kept in the refrigerator. Cold drops in the ear are uncomfortable.  2. Have your child lie down on a flat surface. Gently hold your child’s head to one side.  3. Remove any drainage from the ear with a clean tissue or cotton swab. Clean only the outer ear. Don’t put the cotton swab into the ear canal.  4. Straighten the ear canal by gently pulling the earlobe up and back.  5. Keep the dropper a half-inch above the ear canal. This will keep the dropper from becoming contaminated. Put the drops against the side of the ear canal.  6. Have your child stay lying down for 2 to 3 minutes. This gives time for the medicine to enter the ear canal. If your child doesn’t have  pain, gently massage the outer ear near the opening.  7. Wipe any extra medicine away from the outer ear with a clean cotton ball.    Follow-up care  Follow up with your child’s healthcare provider as directed. Your child will need to have the ear rechecked to make sure the infection has gone away. Check with the healthcare provider to see when they want to see your child.   Special note to parents  If your child continues to get earaches, he or she may need ear tubes. The provider will put small tubes in your child’s eardrum to help keep fluid from building up. This procedure is a simple and works well.   When to seek medical advice  Call your child's healthcare provider for any of the following:   · Fever (see Fever and children, below)  · New symptoms, especially swelling around the ear or weakness of face muscles  · Severe pain  · Infection seems to get worse, not better   · Neck pain  · Your child acts very sick or not himself or herself  · Fever or pain don't improve with antibiotics after 48 hours  Fever and children  Use a digital thermometer to check your child’s temperature. Don’t use a mercury thermometer. There are different kinds and uses of digital thermometers. They include:   · Rectal. For children younger than 3 years, a rectal temperature is the most accurate.  · Forehead (temporal). This works for children age 3 months and older. If a child under 3 months old has signs of illness, this can be used for a first pass. The provider may want to confirm with a rectal temperature.  · Ear (tympanic). Ear temperatures are accurate after 6 months of age, but not before.  · Armpit (axillary). This is the least reliable but may be used for a first pass to check a child of any age with signs of illness. The provider may want to confirm with a rectal temperature.  · Mouth (oral). Don’t use a thermometer in your child’s mouth until he or she is at least 4 years old.  Use the rectal thermometer with care. Follow  the product maker’s directions for correct use. Insert it gently. Label it and make sure it’s not used in the mouth. It may pass on germs from the stool. If you don’t feel OK using a rectal thermometer, ask the healthcare provider what type to use instead. When you talk with any healthcare provider about your child’s fever, tell him or her which type you used.   Below are guidelines to know if your young child has a fever. Your child’s healthcare provider may give you different numbers for your child. Follow your provider’s specific instructions.   Fever readings for a baby under 3 months old:   · First, ask your child’s healthcare provider how you should take the temperature.  · Rectal or forehead: 100.4°F (38°C) or higher  · Armpit: 99°F (37.2°C) or higher  Fever readings for a child age 3 months to 36 months (3 years):   · Rectal, forehead, or ear: 102°F (38.9°C) or higher  · Armpit: 101°F (38.3°C) or higher  Call the healthcare provider in these cases:   · Repeated temperature of 104°F (40°C) or higher in a child of any age  · Fever of 100.4° F (38° C) or higher in baby younger than 3 months  · Fever that lasts more than 24 hours in a child under age 2  · Fever that lasts for 3 days in a child age 2 or older    [employer] last reviewed this educational content on 2020 © 2000-2021 [employer]. All rights reserved. This information is not intended as a substitute for professional medical care. Always follow your healthcare professional's instructions.           Patient Education     Viral Upper Respiratory Illness (Child)  Your child has a viral upper respiratory illness (URI). This is also called a common cold. The virus is contagious during the first few days. It is spread through the air by coughing or sneezing, or by direct contact. This means by touching your sick child then touching your own eyes, nose, or mouth. Washing your hands often will decrease risk of spreading the virus. Most viral  illnesses go away within 7 to 14 days with rest and simple home remedies. But they may sometimes last up to 4 weeks. Antibiotics will not kill a virus. They are generally not prescribed for this condition.     Home care  · Fluids. Fever increases the amount of water lost from the body. Encourage your child to drink lots of fluids to loosen lung secretions and make it easier to breathe.   ? For babies under 1 year old,  continue regular formula feedings or breastfeeding. Between feedings, give oral rehydration solution. This is available from drugstores and grocery stores without a prescription.  ? For children over 1 year old, give plenty of fluids, such as water, juice, gelatin water, soda without caffeine, ginger ale, lemonade, or ice pops.  · Eating. If your child doesn't want to eat solid foods, it's OK for a few days, as long as he or she drinks lots of fluid.  · Rest. Keep children with fever at home resting or playing quietly until the fever is gone. Encourage frequent naps. Your child may return to  or school when the fever is gone and he or she is eating well, does not tire easily, and is feeling better.  · Sleep. Periods of sleeplessness and irritability are common.  ? Children 1 year and older:  Have your child sleep in a slightly upright position. This is to help make breathing easier. If possible, raise the head of the bed slightly. Or raise your older child’s head and upper body up with extra pillows. Talk with your healthcare provider about how far to raise your child's head.  ? Babies younger than 12 months: Never use pillows or put your baby to sleep on their stomach or side. Babies younger than 12 months should sleep on a flat surface on their back. Don't use car seats, strollers, swings, baby carriers, and baby slings for sleep. If your baby falls asleep in one of these, move them to a flat, firm surface as soon as you can.     · Cough. Coughing is a normal part of this illness. A cool mist  humidifier at the bedside may help. Clean the humidifier every day to prevent mold. Over-the-counter cough and cold medicines don't help any better than syrup with no medicine in it. They also can cause serious side effects, especially in babies under 2 years of age. Don't give OTC cough or cold medicines to children under 6 years unless your healthcare provider has specifically advised you to do so.  ? Keep your child away from cigarette smoke. It can make the cough worse. Don't let anyone smoke in your house or car.  · Nasal congestion. Suction the nose of babies with a bulb syringe. You may put 2 to 3 drops of saltwater (saline) nose drops in each nostril before suctioning. This helps thin and remove secretions. Saline nose drops are available without a prescription. You can also use 1/4 teaspoon of table salt dissolved in 1 cup of water.  · Fever. Use children’s acetaminophen for fever, fussiness, or discomfort, unless another medicine was prescribed. In babies over 6 months of age, you may use children’s ibuprofen or acetaminophen. If your child has chronic liver or kidney disease, talk with your child's healthcare provider before using these medicines. Also talk with the provider if your child has had a stomach ulcer or digestive bleeding. Never give aspirin to anyone younger than 18 years of age who is ill with a viral infection or fever. It may cause severe liver or brain damage.  · Preventing spread. Washing your hands before and after touching your sick child will help prevent a new infection. It will also help prevent the spread of this viral illness to yourself and other children. In an age-appropriate manner, teach your children when, how, and why to wash their hands. Role model correct handwashing. Encourage adults in your home to wash hands often.    Follow-up care  Follow up with your healthcare provider, or as advised.  When to seek medical advice  For a usually healthy child, call your child's  healthcare provider right away if any of these occur:   · A fever (see Fever and children, below)  · Earache, sinus pain, stiff or painful neck, headache, repeated diarrhea, or vomiting.  · Unusual fussiness.  · A new rash appears.  · Your child is dehydrated, with one or more of these symptoms:  ? No tears when crying.  ? “Sunken” eyes or a dry mouth.  ? No wet diapers for 8 hours in infants.  ? Reduced urine output in older children.  · Your child has new symptoms or you are worried or confused by your child's condition.  Call 911  Call 911 if any of these occur:   · Increased wheezing or difficulty breathing  · Unusual drowsiness or confusion  · Fast breathing:  ? Birth to 6 weeks: over 60 breaths per minute  ? 6 weeks to 2 years: over 45 breaths per minute  ? 3 to 6 years: over 35 breaths per minute  ? 7 to 10 years: over 30 breaths per minute  ? Older than 10 years: over 25 breaths per minute  Fever and children  Always use a digital thermometer to check your child’s temperature. Never use a mercury thermometer.   For infants and toddlers, be sure to use a rectal thermometer correctly. A rectal thermometer may accidentally poke a hole in (perforate) the rectum. It may also pass on germs from the stool. Always follow the product maker’s directions for proper use. If you don’t feel comfortable taking a rectal temperature, use another method. When you talk to your child’s healthcare provider, tell him or her which method you used to take your child’s temperature.   Here are guidelines for fever temperature. Ear temperatures aren’t accurate before 6 months of age. Don’t take an oral temperature until your child is at least 4 years old.   Infant under 3 months old:  · Ask your child’s healthcare provider how you should take the temperature.  · Rectal or forehead (temporal artery) temperature of 100.4°F (38°C) or higher, or as directed by the provider  · Armpit temperature of 99°F (37.2°C) or higher, or as directed  by the provider  Child age 3 to 36 months:  · Rectal, forehead (temporal artery), or ear temperature of 102°F (38.9°C) or higher, or as directed by the provider  · Armpit temperature of 101°F (38.3°C) or higher, or as directed by the provider  Child of any age:  · Repeated temperature of 104°F (40°C) or higher, or as directed by the provider  · Fever that lasts more than 24 hours in a child under 2 years old. Or a fever that lasts for 3 days in a child 2 years or older.  [employer] last reviewed this educational content on 6/1/2018  © 1298-2146 [employer]. All rights reserved. This information is not intended as a substitute for professional medical care. Always follow your healthcare professional's instructions.

## 2024-10-17 ENCOUNTER — TELEPHONE (OUTPATIENT)
Dept: FAMILY MEDICINE CLINIC | Facility: CLINIC | Age: 39
End: 2024-10-17

## 2024-10-17 DIAGNOSIS — Z01.419 CERVICAL SMEAR, AS PART OF ROUTINE GYNECOLOGICAL EXAMINATION: ICD-10-CM

## 2024-10-17 DIAGNOSIS — Z30.432 ENCOUNTER FOR IUD REMOVAL: Primary | ICD-10-CM

## 2024-11-12 ENCOUNTER — OFFICE VISIT (OUTPATIENT)
Dept: NEPHROLOGY | Facility: CLINIC | Age: 39
End: 2024-11-12
Payer: COMMERCIAL

## 2024-11-12 ENCOUNTER — PATIENT MESSAGE (OUTPATIENT)
Dept: FAMILY MEDICINE CLINIC | Facility: CLINIC | Age: 39
End: 2024-11-12

## 2024-11-12 VITALS — SYSTOLIC BLOOD PRESSURE: 138 MMHG | BODY MASS INDEX: 49 KG/M2 | WEIGHT: 293 LBS | DIASTOLIC BLOOD PRESSURE: 60 MMHG

## 2024-11-12 DIAGNOSIS — R73.02 IGT (IMPAIRED GLUCOSE TOLERANCE): Primary | ICD-10-CM

## 2024-11-12 DIAGNOSIS — R80.9 PROTEINURIA, UNSPECIFIED TYPE: Primary | ICD-10-CM

## 2024-11-12 DIAGNOSIS — R73.02 IGT (IMPAIRED GLUCOSE TOLERANCE): ICD-10-CM

## 2024-11-12 DIAGNOSIS — E78.2 MIXED HYPERLIPIDEMIA: ICD-10-CM

## 2024-11-12 DIAGNOSIS — I10 BENIGN ESSENTIAL HTN: ICD-10-CM

## 2024-11-12 DIAGNOSIS — R80.9 PROTEINURIA, UNSPECIFIED TYPE: ICD-10-CM

## 2024-11-12 PROCEDURE — 99204 OFFICE O/P NEW MOD 45 MIN: CPT | Performed by: INTERNAL MEDICINE

## 2024-11-12 PROCEDURE — 3075F SYST BP GE 130 - 139MM HG: CPT | Performed by: INTERNAL MEDICINE

## 2024-11-12 PROCEDURE — 3078F DIAST BP <80 MM HG: CPT | Performed by: INTERNAL MEDICINE

## 2024-11-12 RX ORDER — CHLORTHALIDONE 25 MG/1
25 TABLET ORAL DAILY
Qty: 30 TABLET | Refills: 1 | Status: SHIPPED | OUTPATIENT
Start: 2024-11-12

## 2024-11-12 RX ORDER — LISINOPRIL 20 MG/1
20 TABLET ORAL DAILY
Qty: 90 TABLET | Refills: 0 | Status: SHIPPED | OUTPATIENT
Start: 2024-11-12 | End: 2024-11-12

## 2024-11-12 RX ORDER — METFORMIN HYDROCHLORIDE 500 MG/1
500 TABLET, EXTENDED RELEASE ORAL DAILY
Qty: 30 TABLET | Refills: 0 | Status: SHIPPED | OUTPATIENT
Start: 2024-11-12

## 2024-11-12 RX ORDER — LISINOPRIL 10 MG/1
30 TABLET ORAL DAILY
Qty: 90 TABLET | Refills: 2 | Status: SHIPPED | OUTPATIENT
Start: 2024-11-12

## 2024-11-12 RX ORDER — ATORVASTATIN CALCIUM 10 MG/1
10 TABLET, FILM COATED ORAL DAILY
Qty: 30 TABLET | Refills: 0 | Status: SHIPPED | OUTPATIENT
Start: 2024-11-12

## 2024-11-12 NOTE — PROGRESS NOTES
[facility]  Report of Consultation    Samara Jade Patient Status:  No patient class for patient encounter    1985 MRN [id number]   Location [facility] NEPHROLOGY Attending No att. providers found   Hosp Day # 0 PCP Belinda Day MD     Reason for Consultation:  Proteinuria     History of Present Illness:  Samara Jade is a a(n) 39 year old female with hx of DM, HTN (recent dx and tx initiated in May 2024), HL presents for evaluation of proteinuria.  Patient is otherwise in usual state of health  Denies any LE edema  Rare/occasional nsaid use  No family hx of kidney diseaese; mother w/ ovarian/cervical ca w/ bladder invasion - s/p PNT  Sedentary lifestyle; works for [employer]    Recent labs show normal Cr; A1C 6.3; sub-nephrotic range proteinura      History:  No past medical history on file.  Past Surgical History:   Procedure Laterality Date    Tonsillectomy       No family history on file.   reports that she has quit smoking. Her smoking use included cigarettes. She has never used smokeless tobacco. She reports current alcohol use. She reports that she does not use drugs.    Allergies:  Allergies[1]    Current Medications:  No current facility-administered medications for this visit.  Home Medications:  Prior to Admission Medications   Medication Sig    METFORMIN  MG Oral Tablet 24 Hr Take 1 tablet by mouth once daily    ATORVASTATIN 10 MG Oral Tab Take 1 tablet by mouth once daily    lisinopril 20 MG Oral Tab Take 1 tablet (20 mg total) by mouth daily.    Blood Pressure Does not apply Kit Use daily as needed       Review of Systems:  See HPI; A total of 12 systems reviewed and otherwise unremarkable.    Physical Exam:  Vital signs: Last menstrual period 2024, not currently breastfeeding.  General: No acute distress. Alert and oriented x 3.  HEENT: Moist mucous membranes. EOM-I. PERRL  Neck: No lymphadenopathy.  No JVD. No carotid bruits.  Respiratory:  Clear to auscultation bilaterally.  No wheezes. No rhonchi.  Cardiovascular: S1, S2.  Regular rate and rhythm.  No murmurs. Equal pulses   Abdomen: Soft, nontender, nondistended.    Neurologic: No focal neurological deficits.   Musculoskeletal: Full range of motion of all extremities.  No swelling noted.   Integument: No lesions. No erythema.  Psychiatric: Appropriate mood and affect.    Laboratory:    Labs reviewed     Imaging:  Reviewd     Impression:  Proteinuria - suspect related to DM/HTN; pt w/ new dx of DM about 6 mos ago. Other etiologies not ruled out however  - check serology as noted above  - check renal US  - continue acei- increase dose to 30 mg daily; add hydrochlorothiazide 25 daily - risk benefits reviewed   HTN- borderline; was elevated prior to tx initiation per patient  - low salt diet recommended  Obesity - briefly reviewed role of diet/exercise as well as new meds such as ozempic (GLP1 inh)  HL - on statin    F/U in 1 mo w/ labs and w/u above       Thank you for allowing me to participate in this patient's care.  Please feel free to call me with any questions or concerns.    Seferino Powers MD  11/12/2024  2:26 PM         [1]   Allergies  Allergen Reactions    Sulfa Antibiotics OTHER (SEE COMMENTS)     Pt stated her mother had told her this when she was young and is unaware if this allergy is true

## 2024-11-12 NOTE — TELEPHONE ENCOUNTER
Last OV 5/28/24, Wellness 5/6/24  Last labs:  8/30/24 microalbumin  5/6/24 cmp, lipid, A1c  Last refilled 10/10/24  #30  0 refill

## 2024-11-12 NOTE — TELEPHONE ENCOUNTER
Called Pt and they informed me over the phone they have an appt with OBGYN for a pap this upcoming Thursday:   Future Appointments   Date Time Provider Department Center   11/15/2024  7:30 AM Humberto Hopkins MD [facility]   11/27/2024  1:20 PM Belinda Day MD [facility]    Pt stated they will get the lab orders that Dr. Cano placed this week and scheduled a [facility] appt for the end of this month. Pt states if they are unable to complete pap with OBGYN, they are comfortable doing it with PCP.

## 2024-11-15 ENCOUNTER — LABORATORY ENCOUNTER (OUTPATIENT)
Dept: LAB | Age: 39
End: 2024-11-15
Attending: FAMILY MEDICINE
Payer: COMMERCIAL

## 2024-11-15 DIAGNOSIS — R80.9 PROTEINURIA, UNSPECIFIED TYPE: ICD-10-CM

## 2024-11-15 DIAGNOSIS — R73.02 IGT (IMPAIRED GLUCOSE TOLERANCE): ICD-10-CM

## 2024-11-15 DIAGNOSIS — E78.2 MIXED HYPERLIPIDEMIA: ICD-10-CM

## 2024-11-15 LAB
ALBUMIN SERPL-MCNC: 4.7 G/DL (ref 3.2–4.8)
ALBUMIN/GLOB SERPL: 2 {RATIO} (ref 1–2)
ALP LIVER SERPL-CCNC: 65 U/L
ALT SERPL-CCNC: 20 U/L
ANION GAP SERPL CALC-SCNC: 6 MMOL/L (ref 0–18)
AST SERPL-CCNC: 19 U/L (ref ?–34)
BASOPHILS # BLD AUTO: 0.05 X10(3) UL (ref 0–0.2)
BASOPHILS NFR BLD AUTO: 0.6 %
BILIRUB SERPL-MCNC: 0.5 MG/DL (ref 0.3–1.2)
BILIRUB UR QL STRIP.AUTO: NEGATIVE
BUN BLD-MCNC: 13 MG/DL (ref 9–23)
C3 SERPL-MCNC: 182.8 MG/DL (ref 84–160)
C4 SERPL-MCNC: 29.3 MG/DL (ref 12–36)
CALCIUM BLD-MCNC: 10 MG/DL (ref 8.7–10.4)
CHLORIDE SERPL-SCNC: 101 MMOL/L (ref 98–112)
CHOLEST SERPL-MCNC: 174 MG/DL (ref ?–200)
CLARITY UR REFRACT.AUTO: CLEAR
CO2 SERPL-SCNC: 30 MMOL/L (ref 21–32)
CREAT BLD-MCNC: 0.81 MG/DL
CREAT UR-SCNC: 98.5 MG/DL
EGFRCR SERPLBLD CKD-EPI 2021: 96 ML/MIN/1.73M2 (ref 60–?)
EOSINOPHIL # BLD AUTO: 0.17 X10(3) UL (ref 0–0.7)
EOSINOPHIL NFR BLD AUTO: 2.2 %
ERYTHROCYTE [DISTWIDTH] IN BLOOD BY AUTOMATED COUNT: 12.6 %
EST. AVERAGE GLUCOSE BLD GHB EST-MCNC: 137 MG/DL (ref 68–126)
FASTING PATIENT LIPID ANSWER: YES
FASTING STATUS PATIENT QL REPORTED: YES
GLOBULIN PLAS-MCNC: 2.4 G/DL (ref 2–3.5)
GLUCOSE BLD-MCNC: 108 MG/DL (ref 70–99)
GLUCOSE UR STRIP.AUTO-MCNC: NORMAL MG/DL
HBA1C MFR BLD: 6.4 % (ref ?–5.7)
HBV SURFACE AG SER-ACNC: <0.1 [IU]/L
HBV SURFACE AG SERPL QL IA: NONREACTIVE
HCT VFR BLD AUTO: 42 %
HCV AB SERPL QL IA: NONREACTIVE
HDLC SERPL-MCNC: 44 MG/DL (ref 40–59)
HGB BLD-MCNC: 14.1 G/DL
IMM GRANULOCYTES # BLD AUTO: 0.05 X10(3) UL (ref 0–1)
IMM GRANULOCYTES NFR BLD: 0.6 %
KETONES UR STRIP.AUTO-MCNC: NEGATIVE MG/DL
LDLC SERPL CALC-MCNC: 89 MG/DL (ref ?–100)
LEUKOCYTE ESTERASE UR QL STRIP.AUTO: NEGATIVE
LYMPHOCYTES # BLD AUTO: 1.75 X10(3) UL (ref 1–4)
LYMPHOCYTES NFR BLD AUTO: 22.3 %
MCH RBC QN AUTO: 30.3 PG (ref 26–34)
MCHC RBC AUTO-ENTMCNC: 33.6 G/DL (ref 31–37)
MCV RBC AUTO: 90.1 FL
MONOCYTES # BLD AUTO: 0.51 X10(3) UL (ref 0.1–1)
MONOCYTES NFR BLD AUTO: 6.5 %
NEUTROPHILS # BLD AUTO: 5.32 X10 (3) UL (ref 1.5–7.7)
NEUTROPHILS # BLD AUTO: 5.32 X10(3) UL (ref 1.5–7.7)
NEUTROPHILS NFR BLD AUTO: 67.8 %
NITRITE UR QL STRIP.AUTO: NEGATIVE
NONHDLC SERPL-MCNC: 130 MG/DL (ref ?–130)
OSMOLALITY SERPL CALC.SUM OF ELEC: 285 MOSM/KG (ref 275–295)
PH UR STRIP.AUTO: 5.5 [PH] (ref 5–8)
PLATELET # BLD AUTO: 262 10(3)UL (ref 150–450)
POTASSIUM SERPL-SCNC: 3.8 MMOL/L (ref 3.5–5.1)
PROT SERPL-MCNC: 7.1 G/DL (ref 5.7–8.2)
PROT UR STRIP.AUTO-MCNC: 100 MG/DL
PROT UR-MCNC: 91.1 MG/DL (ref ?–14)
RBC # BLD AUTO: 4.66 X10(6)UL
SODIUM SERPL-SCNC: 137 MMOL/L (ref 136–145)
SP GR UR STRIP.AUTO: 1.01 (ref 1–1.03)
TRIGL SERPL-MCNC: 248 MG/DL (ref 30–149)
UROBILINOGEN UR STRIP.AUTO-MCNC: NORMAL MG/DL
VLDLC SERPL CALC-MCNC: 40 MG/DL (ref 0–30)
WBC # BLD AUTO: 7.9 X10(3) UL (ref 4–11)

## 2024-11-15 PROCEDURE — 80061 LIPID PANEL: CPT

## 2024-11-15 PROCEDURE — 86803 HEPATITIS C AB TEST: CPT

## 2024-11-15 PROCEDURE — 83036 HEMOGLOBIN GLYCOSYLATED A1C: CPT

## 2024-11-15 PROCEDURE — 86038 ANTINUCLEAR ANTIBODIES: CPT

## 2024-11-15 PROCEDURE — 84165 PROTEIN E-PHORESIS SERUM: CPT

## 2024-11-15 PROCEDURE — 85025 COMPLETE CBC W/AUTO DIFF WBC: CPT

## 2024-11-15 PROCEDURE — 83521 IG LIGHT CHAINS FREE EACH: CPT

## 2024-11-15 PROCEDURE — 87340 HEPATITIS B SURFACE AG IA: CPT

## 2024-11-15 PROCEDURE — 84156 ASSAY OF PROTEIN URINE: CPT

## 2024-11-15 PROCEDURE — 86160 COMPLEMENT ANTIGEN: CPT

## 2024-11-15 PROCEDURE — 36415 COLL VENOUS BLD VENIPUNCTURE: CPT

## 2024-11-15 PROCEDURE — 83516 IMMUNOASSAY NONANTIBODY: CPT

## 2024-11-15 PROCEDURE — 86334 IMMUNOFIX E-PHORESIS SERUM: CPT

## 2024-11-15 PROCEDURE — 82570 ASSAY OF URINE CREATININE: CPT

## 2024-11-15 PROCEDURE — 86037 ANCA TITER EACH ANTIBODY: CPT

## 2024-11-15 PROCEDURE — 80053 COMPREHEN METABOLIC PANEL: CPT

## 2024-11-15 PROCEDURE — 86225 DNA ANTIBODY NATIVE: CPT

## 2024-11-15 PROCEDURE — 81001 URINALYSIS AUTO W/SCOPE: CPT

## 2024-11-16 ENCOUNTER — PATIENT MESSAGE (OUTPATIENT)
Dept: FAMILY MEDICINE CLINIC | Facility: CLINIC | Age: 39
End: 2024-11-16

## 2024-11-18 LAB
DSDNA IGG SERPL IA-ACNC: 0.8 IU/ML
ENA AB SER QL IA: 0.1 UG/L
ENA AB SER QL IA: NEGATIVE

## 2024-11-19 LAB
ANTI-MPO ANTIBODIES: <0.2 UNITS
ANTI-PR3 ANTIBODIES: <0.2 UNITS

## 2024-11-21 LAB
ALBUMIN SERPL ELPH-MCNC: 4.14 G/DL (ref 3.75–5.21)
ALBUMIN/GLOB SERPL: 1.5 {RATIO} (ref 1–2)
ALPHA1 GLOB SERPL ELPH-MCNC: 0.28 G/DL (ref 0.19–0.46)
ALPHA2 GLOB SERPL ELPH-MCNC: 0.87 G/DL (ref 0.48–1.05)
B-GLOBULIN SERPL ELPH-MCNC: 0.86 G/DL (ref 0.68–1.23)
GAMMA GLOB SERPL ELPH-MCNC: 0.75 G/DL (ref 0.62–1.7)
KAPPA LC FREE SER-MCNC: 1.44 MG/DL (ref 0.33–1.94)
KAPPA LC FREE/LAMBDA FREE SER NEPH: 1.25 {RATIO} (ref 0.26–1.65)
LAMBDA LC FREE SERPL-MCNC: 1.15 MG/DL (ref 0.57–2.63)
PROT SERPL-MCNC: 6.9 G/DL (ref 5.7–8.2)

## 2024-11-27 ENCOUNTER — VIRTUAL PHONE E/M (OUTPATIENT)
Dept: NEPHROLOGY | Facility: CLINIC | Age: 39
End: 2024-11-27
Payer: COMMERCIAL

## 2024-11-27 ENCOUNTER — OFFICE VISIT (OUTPATIENT)
Dept: FAMILY MEDICINE CLINIC | Facility: CLINIC | Age: 39
End: 2024-11-27
Payer: COMMERCIAL

## 2024-11-27 VITALS
TEMPERATURE: 98 F | HEIGHT: 66.54 IN | SYSTOLIC BLOOD PRESSURE: 124 MMHG | OXYGEN SATURATION: 96 % | WEIGHT: 293 LBS | BODY MASS INDEX: 46.53 KG/M2 | DIASTOLIC BLOOD PRESSURE: 78 MMHG | RESPIRATION RATE: 18 BRPM | HEART RATE: 115 BPM

## 2024-11-27 DIAGNOSIS — I10 BENIGN ESSENTIAL HTN: ICD-10-CM

## 2024-11-27 DIAGNOSIS — R80.9 PROTEINURIA, UNSPECIFIED TYPE: Primary | ICD-10-CM

## 2024-11-27 DIAGNOSIS — Z01.419 ENCOUNTER FOR ANNUAL ROUTINE GYNECOLOGICAL EXAMINATION: ICD-10-CM

## 2024-11-27 DIAGNOSIS — E78.2 MIXED HYPERLIPIDEMIA: ICD-10-CM

## 2024-11-27 DIAGNOSIS — E11.9 CONTROLLED TYPE 2 DIABETES MELLITUS WITHOUT COMPLICATION, WITHOUT LONG-TERM CURRENT USE OF INSULIN (HCC): Primary | ICD-10-CM

## 2024-11-27 DIAGNOSIS — Z23 FLU VACCINE NEED: ICD-10-CM

## 2024-11-27 PROCEDURE — 3044F HG A1C LEVEL LT 7.0%: CPT | Performed by: FAMILY MEDICINE

## 2024-11-27 PROCEDURE — 87624 HPV HI-RISK TYP POOLED RSLT: CPT | Performed by: FAMILY MEDICINE

## 2024-11-27 PROCEDURE — 3074F SYST BP LT 130 MM HG: CPT | Performed by: FAMILY MEDICINE

## 2024-11-27 PROCEDURE — 99213 OFFICE O/P EST LOW 20 MIN: CPT | Performed by: INTERNAL MEDICINE

## 2024-11-27 PROCEDURE — 3060F POS MICROALBUMINURIA REV: CPT | Performed by: FAMILY MEDICINE

## 2024-11-27 PROCEDURE — 99214 OFFICE O/P EST MOD 30 MIN: CPT | Performed by: FAMILY MEDICINE

## 2024-11-27 PROCEDURE — 88175 CYTOPATH C/V AUTO FLUID REDO: CPT | Performed by: FAMILY MEDICINE

## 2024-11-27 PROCEDURE — 3078F DIAST BP <80 MM HG: CPT | Performed by: FAMILY MEDICINE

## 2024-11-27 PROCEDURE — 3008F BODY MASS INDEX DOCD: CPT | Performed by: FAMILY MEDICINE

## 2024-11-27 RX ORDER — TIRZEPATIDE 2.5 MG/.5ML
2.5 INJECTION, SOLUTION SUBCUTANEOUS WEEKLY
Qty: 0.5 ML | Refills: 0 | Status: SHIPPED | OUTPATIENT
Start: 2024-11-27 | End: 2024-12-02

## 2024-11-27 RX ORDER — ATORVASTATIN CALCIUM 20 MG/1
20 TABLET, FILM COATED ORAL DAILY
Qty: 90 TABLET | Refills: 0 | Status: SHIPPED | OUTPATIENT
Start: 2024-11-27

## 2024-11-27 RX ORDER — LISINOPRIL 20 MG/1
20 TABLET ORAL DAILY
COMMUNITY

## 2024-11-27 RX ORDER — LISINOPRIL 30 MG/1
30 TABLET ORAL DAILY
Qty: 90 TABLET | Refills: 0 | Status: SHIPPED | OUTPATIENT
Start: 2024-11-27

## 2024-11-27 NOTE — PROGRESS NOTES
[facility]  Nephrology Clinic Note        Telehealth outside of [facility]  Telehealth Verbal Consent   I conducted a telehealth visit with Samara Jade today, 11/27/24, which was completed using two-way, real-time interactive audio and video communication. This has been done in good james to provide continuity of care in the best interest of the provider-patient relationship, due to the COVID - public health crisis/national emergency where restrictions of face-to-face office visits are ongoing. Every conscious effort was taken to allow for sufficient and adequate time to complete the visit.  The patient was made aware of the limitations of the telehealth visit, including treatment limitations as no physical exam could be performed.  The patient was advised to call 911 or to go to the ER in case there was an emergency.  The patient was also advised of the potential privacy & security concerns related to the telehealth platform.   The patient was made aware of where to find [facility]'s notice of privacy practices, telehealth consent form and other related consent forms and documents.  which are located on the [facility] website. The patient verbally agreed to telehealth consent form, related consents and the risks discussed.    Lastly, the patient confirmed that they were in [place].   Included in this visit, time may have been spent reviewing labs, medications, radiology tests and decision making. Appropriate medical decision-making and tests are ordered as detailed in the plan of care above.  Coding/billing information is submitted for this visit based on complexity of care and/or time spent for the visit.  15min      History of Present Illness:  Samara Jade is a a(n) 39 year old female with hx of DM, HTN (recent dx and tx initiated in May 2024),     We reviewed her rcent w/u (US pending)  She is doing well  Reports BP is imroved 130s systolic    Patient is otherwise in usual state of health  Denies any LE  edema     No family hx of kidney diseaese; mother w/ ovarian/cervical ca w/ bladder invasion - s/p PNT  Sedentary lifestyle; works for [employer]           History:  No past medical history on file.  Past Surgical History:   Procedure Laterality Date    Tonsillectomy       No family history on file.   reports that she has quit smoking. Her smoking use included cigarettes. She has never used smokeless tobacco. She reports current alcohol use. She reports that she does not use drugs.    Allergies:  Allergies[1]    Current Medications:  No current facility-administered medications for this visit.  Home Medications:  Prior to Admission Medications   Medication Sig    METFORMIN  MG Oral Tablet 24 Hr Take 1 tablet by mouth once daily    ATORVASTATIN 10 MG Oral Tab Take 1 tablet by mouth once daily    lisinopril 10 MG Oral Tab Take 3 tablets (30 mg total) by mouth daily.    chlorthalidone 25 MG Oral Tab Take 1 tablet (25 mg total) by mouth daily.    Blood Pressure Does not apply Kit Use daily as needed       Review of Systems:  See HPI; A total of 12 systems reviewed and otherwise unremarkable.    Physical Exam:  Vital signs: Last menstrual period 05/05/2024, not currently breastfeeding.  General: No acute distress. Alert and oriented x 3.  HEENT: Moist mucous membranes. EOM-I. PERRL  Neck: No lymphadenopathy.  No JVD. No carotid bruits.  Respiratory: Clear to auscultation bilaterally.  No wheezes. No rhonchi.  Cardiovascular: S1, S2.  Regular rate and rhythm.  No murmurs. Equal pulses   Abdomen: Soft, nontender, nondistended.    Neurologic: No focal neurological deficits.   Musculoskeletal: Full range of motion of all extremities.  No swelling noted.   Integument: No lesions. No erythema.  Psychiatric: Appropriate mood and affect.    Laboratory:    Labs reviewed     Imaging:  Reviewd     Impression:  Proteinuria - suspect related to DM/HTN; pt w/ new dx of DM about 6 mos ago. Other etiologies not ruled out  however  -Serologic w/u is fairly unremarkable  - await US   - for now we plan to monitor ; reviewed role of bx  - continue ACEI  - reviewed role of sgt2i/kerendia- pt will consider and discus w/ primary - reviewed risk/benefits briefly   -hydrochlorothiazide 25 daily    HTN- as above; improved per pt  Obesity - briefly reviewed role of diet/exercise as well as new meds such as ozempic (GLP1 inh)- also w/ reported benefit to renal issues overall.   HL - on statin    F/U in 6 mo w/ labs     Thank you for allowing me to participate in this patient's care.  Please feel free to call me with any questions or concerns.    Seferino Powers MD  11/27/2024             [1]   Allergies  Allergen Reactions    Sulfa Antibiotics OTHER (SEE COMMENTS)     Pt stated her mother had told her this when she was young and is unaware if this allergy is true

## 2024-11-27 NOTE — PROGRESS NOTES
Chief Complaint   Patient presents with    Medication Follow-Up     To discuss meds, and what the Endo suggested      HPI  Patient informed her labs indicate she is now diabetic and she would benefit from GLP-1 therapy for weight loss and diabetes. She v/u/a  GLP-1 Medication side effects and benefits discussed with patient.  Patient advised to read the drug insert for any further information and call with questions    Patient due for PAP and flu shot agrees to both but will return after the holiday for the flu shot./ she has had her IUD for 14 years and has appt with OB GYN scheduled in 8 weeks    ROS  As per HPI and all other systems reviewed and are negative      History reviewed. No pertinent past medical history.    Past Surgical History:   Procedure Laterality Date    Tonsillectomy         Social History     Socioeconomic History    Marital status:    Tobacco Use    Smoking status: Former     Types: Cigarettes     Passive exposure: Never    Smokeless tobacco: Never    Tobacco comments:     Quit 10 years ago   Vaping Use    Vaping status: Former   Substance and Sexual Activity    Alcohol use: Yes     Comment: socially 1x/ month    Drug use: Never       History reviewed. No pertinent family history.     Medications Ordered Prior to Encounter[1]      Objective  Vitals:    11/27/24 1312   BP: 124/78   Pulse: 115   Resp: 18   Temp: 98.1 °F (36.7 °C)   TempSrc: Temporal   SpO2: 96%   Weight: (!) 312 lb 6.4 oz (141.7 kg)   Height: 5' 6.54\" (1.69 m)     Physical Exam  Constitutional:       Appearance: Normal appearance.   HEENT:      Head: Normocephalic and atraumatic.      Eyes: PERRLA no notable nystagmus     Ears: Normal on observation     Nose: Nose normal.      Mouth: Mucous membranes are moist.      Neck: No masses no bruit  Cardiovascular:      Rate and Rhythm: Normal rate and regular rhythm.   Pulmonary:      Effort: Pulmonary effort is normal.      Breath sounds: Normal breath sounds.   Abdominal:       General: Bowel sounds are normal.      Palpations: Abdomen is soft. There is no mass.   Pelvis:      Cervix and vagina normal without discharge or notable changes. PAP completed and chaperoned by Gaye  Musculoskeletal:         General: Normal range of motion.      Cervical back: Normal range of motion.   Skin:     General: Skin is warm and dry.   Neurological:      General: No focal deficit present.      Mental Status: She is alert and oriented to person, place, and time.   Psychiatric:         Mood and Affect: Mood normal.         Thought Content: Thought content normal.       Assessment and Plan  Samara was seen today for medication follow-up.    Diagnoses and all orders for this visit:    Controlled type 2 diabetes mellitus without complication, without long-term current use of insulin (HCC)  -     Tirzepatide (MOUNJARO) 2.5 MG/0.5ML Subcutaneous Solution Auto-injector; Inject 2.5 mg into the skin once a week.  -     EDUCATION-OP REFERRAL TO DIAB NUTRITION MANAGEMENT 3 HRS  -     atorvastatin 20 MG Oral Tab; Take 1 tablet (20 mg total) by mouth daily.  -     lisinopril 30 MG Oral Tab; Take 1 tablet (30 mg total) by mouth daily.    Mixed hyperlipidemia  -     atorvastatin 20 MG Oral Tab; Take 1 tablet (20 mg total) by mouth daily.    Benign essential HTN  -     lisinopril 30 MG Oral Tab; Take 1 tablet (30 mg total) by mouth daily.    Encounter for annual routine gynecological examination  -     ThinPrep PAP Smear; Future  -     Hpv Dna  High Risk , Thin Prep Collect; Future  -     ThinPrep PAP Smear  -     Hpv Dna  High Risk , Thin Prep Collect    Flu vaccine need  -     Fluzone trivalent vaccine, PF 0.5mL, 6mo+ (70587); Future           Follow up  Return in about 3 months (around 2/27/2025).      Patient Instructions  There are no Patient Instructions on file for this visit.       Belinda Day MD       This note was created by Dragon voice recognition. Errors in content may be related to improper  recognition by the system; efforts to review and correct have been done but errors may still exist. Please be advised the primary purpose of this note is for me to communicate medical care. Standard sentence structure is not always used. Medical terminology and medical abbreviations may be used. There may be grammatical, typographical, and automated fill ins that may have errors missed in proofreading.          [1]   Current Outpatient Medications on File Prior to Visit   Medication Sig Dispense Refill    lisinopril 20 MG Oral Tab Take 1 tablet (20 mg total) by mouth daily.      METFORMIN  MG Oral Tablet 24 Hr Take 1 tablet by mouth once daily 30 tablet 0    chlorthalidone 25 MG Oral Tab Take 1 tablet (25 mg total) by mouth daily. 30 tablet 1    Blood Pressure Does not apply Kit Use daily as needed 1 kit 0     No current facility-administered medications on file prior to visit.

## 2024-11-29 LAB — HPV E6+E7 MRNA CVX QL NAA+PROBE: NEGATIVE

## 2024-12-02 ENCOUNTER — TELEPHONE (OUTPATIENT)
Dept: FAMILY MEDICINE CLINIC | Facility: CLINIC | Age: 39
End: 2024-12-02

## 2024-12-02 ENCOUNTER — PATIENT MESSAGE (OUTPATIENT)
Dept: FAMILY MEDICINE CLINIC | Facility: CLINIC | Age: 39
End: 2024-12-02

## 2024-12-02 DIAGNOSIS — E11.9 CONTROLLED TYPE 2 DIABETES MELLITUS WITHOUT COMPLICATION, WITHOUT LONG-TERM CURRENT USE OF INSULIN (HCC): ICD-10-CM

## 2024-12-02 RX ORDER — TIRZEPATIDE 2.5 MG/.5ML
2.5 INJECTION, SOLUTION SUBCUTANEOUS WEEKLY
Qty: 2 ML | Refills: 0 | Status: SHIPPED | OUTPATIENT
Start: 2024-12-02 | End: 2024-12-24

## 2024-12-02 NOTE — TELEPHONE ENCOUNTER
Received fax from [facility]  regarding patient's Rx (MOUNJARO) 2.5 MG/0.5ML Subcutaneous Solution Auto-injector     \"Written for 1 pen, boxes come in 4 pens and cannot break box - please advise\"    Dr. Cano reviewed: inject 2.5 mg into skin once a week x4 weeks, dispense 2ML    Rx resent     Disp Refills Start End    Tirzepatide (MOUNJARO) 2.5 MG/0.5ML Subcutaneous Solution Auto-injector 2 mL 0 12/2/2024 12/24/2024    Sig - Route: Inject 2.5 mg into the skin once a week for 4 doses. - Subcutaneous    Sent to pharmacy as: Mounjaro 2.5 MG/0.5ML Subcutaneous Solution Auto-injector (Tirzepatide)    E-Prescribing Status: Receipt confirmed by pharmacy (12/2/2024  9:04 AM CST)

## 2024-12-04 ENCOUNTER — TELEPHONE (OUTPATIENT)
Dept: FAMILY MEDICINE CLINIC | Facility: CLINIC | Age: 39
End: 2024-12-04

## 2024-12-04 NOTE — TELEPHONE ENCOUNTER
Received fax from [employer] / pharmacy regarding PA request for Rx (MOUNJARO) 2.5 MG/0.5ML Subcutaneous Solution Auto-injector     ePA requested via Epic  ePA questions answered and submitted via Epic    Awaiting PA response

## 2024-12-05 ENCOUNTER — HOSPITAL ENCOUNTER (OUTPATIENT)
Dept: ULTRASOUND IMAGING | Age: 39
Discharge: HOME | End: 2024-12-05
Attending: INTERNAL MEDICINE
Payer: COMMERCIAL

## 2024-12-05 DIAGNOSIS — R80.9 PROTEINURIA, UNSPECIFIED TYPE: ICD-10-CM

## 2024-12-05 PROCEDURE — 76770 US EXAM ABDO BACK WALL COMP: CPT | Performed by: INTERNAL MEDICINE

## 2024-12-05 NOTE — TELEPHONE ENCOUNTER
Please refer to TE 12/04/24    Awaiting PA response    MCM sent to patient  Notify me if not read by 12/09/24

## 2024-12-10 LAB
.: NORMAL
.: NORMAL

## 2024-12-11 NOTE — TELEPHONE ENCOUNTER
Per Dr. Cano: She needs to verify what her insurance will cover for weigh loss first. I can also do referral to weight loss clinic    Advised patient of Dr. Cano's note above. Patient verbalized understanding and stated will contact insurance. No further questions at this time.

## 2024-12-11 NOTE — TELEPHONE ENCOUNTER
Per Dr. Cano: Please let Patient know that she is not eligible for this medication unless she is diabetic with A1c over 6.5 at start of treatment     Advised patient of Dr. Cano's note above. Patient verbalized understanding.   Patient asking what the plan is now?  Start alternative med?    Please advise, thank you

## 2024-12-11 NOTE — TELEPHONE ENCOUNTER
Denied   12/5/2024  8:00 PM  Appeal supported: No   Note from payer: Details of this decision are provided on the physician outcome notice which has been faxed to the number on file.   Payer: [facility] Case ID: [id number]    [phone]        Received fax from [employer] regarding PA denial for Rx Mounjaro 2.5 MG/0.5ML Subcutaneous Solution Auto-injector (Tirzepatide)     Patient needs documentation of A1C level of 6.5% or greater    Reviewed patient's chart -   11/15/24 A1C: 6.4%  05/06/24 A1C: 6.3%    Please advise, thank you

## 2024-12-13 ENCOUNTER — TELEPHONE (OUTPATIENT)
Dept: FAMILY MEDICINE CLINIC | Facility: CLINIC | Age: 39
End: 2024-12-13

## 2024-12-13 DIAGNOSIS — E11.9 CONTROLLED TYPE 2 DIABETES MELLITUS WITHOUT COMPLICATION, WITHOUT LONG-TERM CURRENT USE OF INSULIN (HCC): Primary | ICD-10-CM

## 2024-12-13 NOTE — TELEPHONE ENCOUNTER
[facility] [address] [place] [address] - [address] [phone], [phone]   [address] [facility] [address]   Phone: [phone] Fax: [phone]   Hours: Not open 24 hours       PATIENT CALLING THIS MORNING.  SHE CALLED HER INSURANCE COMPANY TO SEE WHAT THEY WOULD COVER SINCE THEY WONT COVER THE MOUJARO.  PATIENT SAYS HER INSURANCE ADVISED HER THAT OZEMPIC AND WEGOVY ARE COVERED BUT WILL STILL NEED A PA.  PATIENT LEAVING UP TO DR SWEENEY TO DECIDE WHICH OF THE 2 IS MORE APPROPRIATE FOR HER.

## 2024-12-16 DIAGNOSIS — R73.02 IGT (IMPAIRED GLUCOSE TOLERANCE): ICD-10-CM

## 2024-12-16 DIAGNOSIS — E78.2 MIXED HYPERLIPIDEMIA: ICD-10-CM

## 2024-12-16 RX ORDER — METFORMIN HYDROCHLORIDE 500 MG/1
500 TABLET, EXTENDED RELEASE ORAL DAILY
Qty: 30 TABLET | Refills: 0 | Status: SHIPPED | OUTPATIENT
Start: 2024-12-16

## 2024-12-16 RX ORDER — ATORVASTATIN CALCIUM 10 MG/1
10 TABLET, FILM COATED ORAL DAILY
Qty: 30 TABLET | Refills: 0 | Status: SHIPPED | OUTPATIENT
Start: 2024-12-16

## 2024-12-16 NOTE — TELEPHONE ENCOUNTER
metFORMIN HCl  MG Oral Tablet Extended Release 24 Hour   Last refill:11/12/2024, for #30, 0 refill  Last lab:CMP, 11/15/2024, abnormal    Diabetes Medication Protocol Jhiucf3512/16/2024 05:32 AM   Protocol Details Last A1C < 7.5 and within past 6 months    In person appointment or virtual visit in the past 6 mos or appointment in next 3 mos    Microalbumin procedure in past 12 months or taking ACE/ARB    EGFRCR or GFRNAA > 50    GFR in the past 12 months        Atorvastatin Calcium 10 MG Oral Tablet   Last refill:11/12/2024-11/27/2024, for #30, 0 refill  Last lab:Lipid, 11/15/2024, abnormal    Cholesterol Medication Protocol Dudysw6612/16/2024 05:32 AM   Protocol Details ALT < 80    ALT resulted within past year    Lipid panel within past 12 months    In person appointment or virtual visit in the past 12 mos or appointment in next 3 mos      LOV 11/27/2024  Last Px:5/6/2024  Future Appointments   Date Time Provider Department Center   1/31/2025  7:30 AM Humberto Hopkins MD [facility] [place]   5/20/2025  4:00 PM Seferino Powers MD [id number] [address]

## 2024-12-17 ENCOUNTER — TELEPHONE (OUTPATIENT)
Dept: FAMILY MEDICINE CLINIC | Facility: CLINIC | Age: 39
End: 2024-12-17

## 2024-12-17 ENCOUNTER — PATIENT MESSAGE (OUTPATIENT)
Dept: FAMILY MEDICINE CLINIC | Facility: CLINIC | Age: 39
End: 2024-12-17

## 2024-12-17 NOTE — TELEPHONE ENCOUNTER
Reviewed patient's chart -     LOV 11/27/24 11/27/24 Rx Atorvastatin 20 mg sent  12/16/24 Rx Atorvastatin 10 mg sent    Should patient be on 20 mg or 10 mg?  Please advise, thank you

## 2024-12-17 NOTE — TELEPHONE ENCOUNTER
Received fax from [facility] pharmacy regarding \"Need for Clarification\"  Rx Atorvastatin 10 mg tabs, qty 30    \"Call pharmacy regarding therapeutic duplication\"

## 2024-12-17 NOTE — TELEPHONE ENCOUNTER
Per Dr. Cano: She was on 10mg but cholesterol not well controlled so increased to 20mg     Rx atorvastatin 10 mg sent yesterday cancelled    Called [facility] pharmacy ph#[phone] - advised pharmacist of notes above - she verbalized understanding. No further questions at this time

## 2024-12-17 NOTE — TELEPHONE ENCOUNTER
Please refer to TE 12/13/24    ePA questions answered and submitted  Awaiting PA response    MCM sent to patient  Notify me if not read by 12/19/24

## 2024-12-23 NOTE — TELEPHONE ENCOUNTER
Closed   12/18/2024  7:40 AM  Close reason: Other   Note from payer: This request was unable to be completed. Please contact [employer] for more details.   Payer: [employer] [facility] Case ID: [id number]    [phone]    [phone]

## 2024-12-23 NOTE — TELEPHONE ENCOUNTER
Received fax from [employer] regarding Rx drug coverage determination notice   For Rx Semaglutide-Weight Management 0.25 MG/0.5ML Subcutaneous Solution Auto-injector (Wegovy)     DENIED  \"You must have a condition that is approved by the FDA for the requested drug... Please note, all requested strengths have been reviewed and are reflected in this denial letter\"    (Patient does not have recorded A1C >6.5%)  Please advise, thank you    MCM sent to to patient  Notify me if not read by 12/27/24

## 2025-01-02 NOTE — TELEPHONE ENCOUNTER
Denied   12/27/2024  9:00 PM  Appeal supported: No   Note from payer: Details of this decision are provided on the physician outcome notice which has been faxed to the number on file.   Payer: [employer] [facility] Case ID: [id number]    [phone]    [phone]      No fax received at this time  MCM sent to patient  Notify me if not read by 01/06/25

## 2025-01-07 RX ORDER — CHLORTHALIDONE 25 MG/1
25 TABLET ORAL DAILY
Qty: 90 TABLET | Refills: 1 | Status: SHIPPED | OUTPATIENT
Start: 2025-01-07 | End: 2025-03-21

## 2025-01-07 NOTE — TELEPHONE ENCOUNTER
ePA re-requested via epic    ePA questions answered and submitted    Awaiting PA response    MCM sent to patient

## 2025-01-08 NOTE — TELEPHONE ENCOUNTER
Approved   1/7/2025  1:04 PM  Appeal supported: No   Note from payer: The case has been Approved from  [id number] to [id number]   Payer: [employer] [facility] Case ID: [id number]    [phone]    [phone]      MCM sent to patient  Notify me if not read by 01/13/25      Received fax from "[employer] regarding PA approval - faxed to [facility] pharmacy fax#[phone]

## 2025-01-09 ENCOUNTER — MED REC SCAN ONLY (OUTPATIENT)
Dept: FAMILY MEDICINE CLINIC | Facility: CLINIC | Age: 40
End: 2025-01-09

## 2025-01-09 NOTE — TELEPHONE ENCOUNTER
Called [facility] pharmacy #[phone] - advised staff that patient picked up Rx of 0.5 mg dose - needs to start with 0.25 mg dose - pharmacist stated will have the 0.25 mg dose ready for patient to , will be unable to return the 0.5 mg - patient will just have to keep both Rx.    Advised patient of note above. Patient verbalized understanding. Advised patient to keep 0.5mg dose refrigerated and to start that after completing 0.25mg dose for 8 weeks - she verbalized understanding. No further questions at this time.

## 2025-01-15 DIAGNOSIS — R73.02 IGT (IMPAIRED GLUCOSE TOLERANCE): ICD-10-CM

## 2025-01-15 RX ORDER — METFORMIN HYDROCHLORIDE 500 MG/1
500 TABLET, EXTENDED RELEASE ORAL DAILY
Qty: 30 TABLET | Refills: 0 | Status: SHIPPED | OUTPATIENT
Start: 2025-01-15

## 2025-01-15 NOTE — TELEPHONE ENCOUNTER
Diabetes Medication Protocol Jozblk81/15/2025 05:33 AM   Protocol Details Last A1C < 7.5 and within past 6 months    In person appointment or virtual visit in the past 6 mos or appointment in next 3 mos    Microalbumin procedure in past 12 months or taking ACE/ARB    EGFRCR or GFRNAA > 50    GFR in the past 12 months    Medication is active on med list

## 2025-02-13 DIAGNOSIS — R73.02 IGT (IMPAIRED GLUCOSE TOLERANCE): ICD-10-CM

## 2025-02-13 RX ORDER — METFORMIN HYDROCHLORIDE 500 MG/1
500 TABLET, EXTENDED RELEASE ORAL DAILY
Qty: 30 TABLET | Refills: 0 | Status: SHIPPED | OUTPATIENT
Start: 2025-02-13

## 2025-02-13 NOTE — TELEPHONE ENCOUNTER
Diabetes Medication Protocol Passed02/13/2025 05:32 AM   Protocol Details Last A1C < 7.5 and within past 6 months    In person appointment or virtual visit in the past 6 mos or appointment in next 3 mos    Microalbumin procedure in past 12 months or taking ACE/ARB    EGFRCR or GFRNAA > 50    GFR in the past 12 months    Medication is active on med list

## 2025-02-19 ENCOUNTER — OFFICE VISIT (OUTPATIENT)
Dept: OBGYN CLINIC | Facility: CLINIC | Age: 40
End: 2025-02-19
Payer: COMMERCIAL

## 2025-02-19 VITALS
HEART RATE: 98 BPM | DIASTOLIC BLOOD PRESSURE: 76 MMHG | WEIGHT: 293 LBS | SYSTOLIC BLOOD PRESSURE: 128 MMHG | BODY MASS INDEX: 46.53 KG/M2 | HEIGHT: 66.5 IN

## 2025-02-19 DIAGNOSIS — Z00.00 ENCOUNTER FOR HEALTH MAINTENANCE EXAMINATION: Primary | ICD-10-CM

## 2025-02-19 DIAGNOSIS — T73.2XXA FATIGUE DUE TO EXPOSURE, INITIAL ENCOUNTER: ICD-10-CM

## 2025-02-19 PROCEDURE — 3008F BODY MASS INDEX DOCD: CPT | Performed by: OBSTETRICS & GYNECOLOGY

## 2025-02-19 PROCEDURE — 3074F SYST BP LT 130 MM HG: CPT | Performed by: OBSTETRICS & GYNECOLOGY

## 2025-02-19 PROCEDURE — 99204 OFFICE O/P NEW MOD 45 MIN: CPT | Performed by: OBSTETRICS & GYNECOLOGY

## 2025-02-19 PROCEDURE — 3078F DIAST BP <80 MM HG: CPT | Performed by: OBSTETRICS & GYNECOLOGY

## 2025-02-19 RX ORDER — SEMAGLUTIDE 0.25 MG/.5ML
0.25 INJECTION, SOLUTION SUBCUTANEOUS WEEKLY
COMMUNITY
Start: 2025-02-04

## 2025-02-19 RX ORDER — SEMAGLUTIDE 0.5 MG/.5ML
INJECTION, SOLUTION SUBCUTANEOUS
COMMUNITY
Start: 2025-01-08

## 2025-02-19 NOTE — PROGRESS NOTES
[facility]  Obstetrics and Gynecology  History & Physical    CC: Discuss irregular bleeding pattern    Subjective:     HPI: Samara Jade is a 40 year old  female presents to establish care.      She previously had not established care for several years. She has since established with a PCP and endocrinologist. She has started Wegovy and lost 15 lbs. She has worked on dietary and lifestyle changes and is transitioning to an active lifestyle. She notes having a hormonal IUD in place since 2012 after the birth of her child. It helped lighten her menstrual cycles but now she is having irregular vaginal bleeding, nearly every 2 weeks. She is not sure if she is perimenopausal or if this is irregular bleeding for additional issues. She does not use contraception to prevent pregnancy because she has a female partner (who is a ). She would like to discuss a plan for care moving forward and declines examination today.     OB History:  OB History    Para Term  AB Living   1 0 0 0 0 1   SAB IAB Ectopic Multiple Live Births   0 0 0 0 1       Gyne History:  Menarche: 14  Period Cycle (Days): Irregular  Period Duration (Days): 7-8 days  Period Flow: light to heavy  Use of Birth Control (if yes, specify type): Other  Date When Birth Control Last Used: Pt has IUD, unsure of what kind  Hx Prior Abnormal Pap: No  Pap Date: 24  Pap Result Notes: [facility]  Patient's last menstrual period was 2025 (exact date).    Pap smear history:  -2024 - NILM HPV neg     Denies history of STDs (non-HPV).   Currently sexually active   Birth control: IUD in place since  (believe it to be Mirena)      Meds:  Medications Ordered Prior to Encounter[1]    All:  Allergies[2]    PMH:  Past Medical History:    Hypertension       Immunization History:   Immunization History   Administered Date(s) Administered    Covid-19 Vaccine Moderna 100 mcg/0.5 ml 2021, 2021    TDAP  05/06/2024       PSH:  Past Surgical History:   Procedure Laterality Date    Tonsillectomy         Social History:  Social History     Socioeconomic History    Marital status:      Spouse name: Not on file    Number of children: Not on file    Years of education: Not on file    Highest education level: Not on file   Occupational History    Not on file   Tobacco Use    Smoking status: Former     Types: Cigarettes     Passive exposure: Never    Smokeless tobacco: Never    Tobacco comments:     Quit 10 years ago   Vaping Use    Vaping status: Former   Substance and Sexual Activity    Alcohol use: Not Currently    Drug use: Never    Sexual activity: Yes     Partners: Female     Birth control/protection: I.U.D.   Other Topics Concern    Caffeine Concern Yes    Exercise Yes    Seat Belt Yes    Special Diet Not Asked    Stress Concern Not Asked    Weight Concern Not Asked     Service Not Asked    Blood Transfusions Not Asked    Occupational Exposure Not Asked    Hobby Hazards Not Asked    Sleep Concern Not Asked    Back Care Not Asked    Bike Helmet Not Asked    Self-Exams Not Asked   Social History Narrative    Not on file     Social Drivers of Health     Food Insecurity: Not on file   Transportation Needs: Not on file   Stress: Not on file   Housing Stability: Not on file         Patient feels unsafe or threatened?: NO    Abuse: None (physical, sexual or mental)    Family History:  Family History   Problem Relation Age of Onset    Other (cervical cancer) Mother     Breast Cancer Other        Health maintenance:  Mammogram (age 42 and q1-2yr): N/A  Colonoscopy (age 47 and q10yr): N/A    Review of Systems:  General: no complaints per category. See HPI for additional information.   Breast: no complaints per category. See HPI for additional information.   Respiratory: no complaints per category. See HPI for additional information.   Cardiovascular: no complaints per category. See HPI for additional  information.   GI: no complaints per category. See HPI for additional information.   : no complaints per category. See HPI for additional information.   Heme: no complaints per category. See HPI for additional information.       Objective:     Vitals:    25 1338   BP: 128/76   Pulse: 98   Weight: (!) 304 lb 6 oz (138.1 kg)   Height: 66.5\"         Body mass index is 48.39 kg/m².    General: AAO.NAD.   CVS exam: normal peripheral perfusion  Chest: non-labored breathing, no tachypnea   Breast: deferred  Abdominal exam: soft, nontender, nondistended  Pelvic exam: deferred  Ext: non-tender, no edema    Assessment:     Samara Jade is a 40 year old  female presents to [facility] care.      Plan:     Problem List Items Addressed This Visit    None  Visit Diagnoses       Encounter for health maintenance examination    -  Primary    Relevant Orders    TSH and Free T4    Free T3 (Triiodothyronine)    Thyroid Peroxidase (TPO) AB    Thyroglobulin Antibody    Vitamin D    Zinc, Plasma Or Serum    Fatigue due to exposure, initial encounter        Relevant Orders    Magnesium    Folic Acid Serum (Folate)    Vitamin B12    C-Reactive Protein    Sed Rate, Westergren (Automated)            Discussing Menopausal Transition  - Symptoms: irregular bleeding (Q2 weeks)   Discussed with patient recommendation to remove IUD given past FDA recommended approval for contraception (8 years) and heavy menstrual bleeding (5 years). Also discussed that IUD in place could contribute to development of endometritis leading to irregular bleeding or mask endometrial polyps on imaging. Offered to remove IUD and patient declined. Will schedule for IUD removal at next appointment. Patient notes her PCP was able to see the IUD strings on pelvic examination.    Also discussed patient has risk factors for endometrial hyperplasia/carcinoma, specifically BMI 48.    Plan to remove IUD and observe bleeding pattern. If continued heavy bleeding  1-3 months post-IUD removal, would recommend obtaining endometrial biopsy prior to management with progesterone therapy (repeat IUD placement vs. Medication management). Patient voiced understanding.   - Age patient's mother went into menopause: 42  - Laboratory workup:    - TSH, fT4, fT3, anti-TPO, anti-thyroglobulin   - Vitamin D, Zinc, Magnesium, folate, and Vitamin B12   - inflammatory markers: CRP, ESR   - Comprehensive overview for menopausal transition   - Nutrition - adquate protein intake (1.3-1.6 g protein per kg), limit carbohydrates to 25 gm/day or less, intake 25 gm of fiber per day   - Movement - stretching, resistance training, and 150 min/week of cardio   - Stress reduction techniques and sleep hygiene  - Management   - Supplementation - Omega 3 fatty acid 2 gm/day, 4000 IUD Vitain D, Creatine 5 gm/day, collagen with adequate hydration    - Liquid IV (low sugar), Ultima Replenisher,       Cervical cancer screening  - discussion held with the patient about ASCCP guidelines  - repeat pap smear due 11/2029  - recommend yearly pelvic examinations during annual exam     Health maintenance  - encouraged to maintain weight at healthy BMI  - discussed importance of exercise and healthy eating  - self breast exam instructions provided  - bilateral screening mammogram recommendations discussed   - d/w patient colon cancer screening to start at age 45  - Management of chronic conditions (HTN, diabetes) as per PCP and endocrinology       All of the findings and plan were discussed with the patient.  She notes understanding and agrees with the plan of care.  All questions were answered to the best of my ability at this time.      RTC for IUD placement     Humberto Hopkins MD   [facility] - OBGYN      Note to patient and family   The 21st Century Cures Act makes medical notes available to patients in the interest of transparency.  However, please be advised that this is a medical document.  It is intended as usgi-jt-fzsd  communication.  It is written and medical language may contain abbreviations or verbiage that are technical and unfamiliar.  It may appear blunt or direct.  Medical documents are intended to carry relevant information, facts as evident, and the clinical opinion of the practitioner.           This note could include assistance by Dragon voice recognition. Errors in content may be related to improper recognition by the system; efforts to review and correct have been done but errors may still exist.          [1]   Current Outpatient Medications on File Prior to Visit   Medication Sig Dispense Refill    WEGOVY 0.25 MG/0.5ML Subcutaneous Solution Auto-injector Inject 0.5 mL (0.25 mg total) into the skin once a week.      METFORMIN  MG Oral Tablet 24 Hr Take 1 tablet by mouth once daily 30 tablet 0    chlorthalidone 25 MG Oral Tab Take 1 tablet (25 mg total) by mouth daily. 90 tablet 1    atorvastatin 20 MG Oral Tab Take 1 tablet (20 mg total) by mouth daily. 90 tablet 0    lisinopril 30 MG Oral Tab Take 1 tablet (30 mg total) by mouth daily. 90 tablet 0    Blood Pressure Does not apply Kit Use daily as needed 1 kit 0    WEGOVY 0.5 MG/0.5ML Subcutaneous Solution Auto-injector INJECT 0.5 ML INTO THE SKIN ONCE A WEEK FOR FOUR DOSES (Patient not taking: Reported on 2/19/2025)       No current facility-administered medications on file prior to visit.   [2]   Allergies  Allergen Reactions    Sulfa Antibiotics OTHER (SEE COMMENTS)     Pt stated her mother had told her this when she was young and is unaware if this allergy is true

## 2025-02-19 NOTE — PATIENT INSTRUCTIONS
Discussing Menopausal Transition  - Symptoms: irregular bleeding (Q2 weeks)  - Age patient's mother went into menopause: 42  - Laboratory workup:    - TSH, fT4, fT3, anti-TPO, anti-thyroglobulin   - Vitamin D, Zinc, Magnesium, folate, and Vitamin B12   - inflammatory markers: CRP, ESR   - Comprehensive overview for transition   - Nutrition - adquate protein intake (1.3-1.6 g protein per kg), limit carbohydrates to 25 gm/day or less, intake 25 gm of fiber per day   - Movement - stretching, resistance training, and 150 min/week of cardio   - Stress reduction techniques and sleep hygiene  - Management   - Supplementation - Omega 3 fatty acid 2 gm/day, 4000 IUD Vitain D, Creatine 5 gm/day, collagen with adequate hydration    - Liquid IV (low sugar), Ultima Replenisher. Avoid LMNT due to 1 gm of sodium

## 2025-03-01 ENCOUNTER — TELEPHONE (OUTPATIENT)
Dept: FAMILY MEDICINE CLINIC | Facility: CLINIC | Age: 40
End: 2025-03-01

## 2025-03-01 NOTE — TELEPHONE ENCOUNTER
Per Dr. Cano: Please call these patients and find out where they had their diabetic eye exam and get the copies. Thank you

## 2025-03-04 NOTE — TELEPHONE ENCOUNTER
Patient returned call. States she is in the process of coordinating scheduled in her household and plans on getting that scheduled in the next few weeks. Advised to have copy of eye exam note sent to Dr Cano

## 2025-03-06 DIAGNOSIS — E11.9 CONTROLLED TYPE 2 DIABETES MELLITUS WITHOUT COMPLICATION, WITHOUT LONG-TERM CURRENT USE OF INSULIN (HCC): ICD-10-CM

## 2025-03-06 DIAGNOSIS — I10 BENIGN ESSENTIAL HTN: ICD-10-CM

## 2025-03-06 DIAGNOSIS — E78.2 MIXED HYPERLIPIDEMIA: ICD-10-CM

## 2025-03-07 ENCOUNTER — LAB ENCOUNTER (OUTPATIENT)
Dept: LAB | Age: 40
End: 2025-03-07
Attending: OBSTETRICS & GYNECOLOGY
Payer: COMMERCIAL

## 2025-03-07 DIAGNOSIS — Z00.00 ENCOUNTER FOR HEALTH MAINTENANCE EXAMINATION: ICD-10-CM

## 2025-03-07 DIAGNOSIS — T73.2XXA FATIGUE DUE TO EXPOSURE, INITIAL ENCOUNTER: ICD-10-CM

## 2025-03-07 LAB
CRP SERPL-MCNC: 0.8 MG/DL (ref ?–0.5)
ERYTHROCYTE [SEDIMENTATION RATE] IN BLOOD: 36 MM/HR
FOLATE SERPL-MCNC: 19.7 NG/ML (ref 5.4–?)
MAGNESIUM SERPL-MCNC: 1.8 MG/DL (ref 1.6–2.6)
T3FREE SERPL-MCNC: 3.24 PG/ML (ref 2.4–4.2)
T4 FREE SERPL-MCNC: 1 NG/DL (ref 0.8–1.7)
THYROGLOB SERPL-MCNC: 17 U/ML (ref ?–60)
THYROPEROXIDASE AB SERPL-ACNC: 38 U/ML (ref ?–60)
TSI SER-ACNC: 1.44 UIU/ML (ref 0.55–4.78)
VIT B12 SERPL-MCNC: 432 PG/ML (ref 211–911)
VIT D+METAB SERPL-MCNC: 28.9 NG/ML (ref 30–100)

## 2025-03-07 PROCEDURE — 84439 ASSAY OF FREE THYROXINE: CPT

## 2025-03-07 PROCEDURE — 82746 ASSAY OF FOLIC ACID SERUM: CPT

## 2025-03-07 PROCEDURE — 84630 ASSAY OF ZINC: CPT

## 2025-03-07 PROCEDURE — 86376 MICROSOMAL ANTIBODY EACH: CPT

## 2025-03-07 PROCEDURE — 86800 THYROGLOBULIN ANTIBODY: CPT

## 2025-03-07 PROCEDURE — 84443 ASSAY THYROID STIM HORMONE: CPT

## 2025-03-07 PROCEDURE — 86140 C-REACTIVE PROTEIN: CPT

## 2025-03-07 PROCEDURE — 82607 VITAMIN B-12: CPT

## 2025-03-07 PROCEDURE — 85652 RBC SED RATE AUTOMATED: CPT

## 2025-03-07 PROCEDURE — 83735 ASSAY OF MAGNESIUM: CPT

## 2025-03-07 PROCEDURE — 36415 COLL VENOUS BLD VENIPUNCTURE: CPT

## 2025-03-07 PROCEDURE — 82306 VITAMIN D 25 HYDROXY: CPT

## 2025-03-07 PROCEDURE — 84481 FREE ASSAY (FT-3): CPT

## 2025-03-07 RX ORDER — ATORVASTATIN CALCIUM 20 MG/1
20 TABLET, FILM COATED ORAL DAILY
Qty: 90 TABLET | Refills: 0 | Status: SHIPPED | OUTPATIENT
Start: 2025-03-07

## 2025-03-07 RX ORDER — LISINOPRIL 30 MG/1
30 TABLET ORAL DAILY
Qty: 90 TABLET | Refills: 0 | Status: SHIPPED | OUTPATIENT
Start: 2025-03-07

## 2025-03-12 LAB — ZINC: 72 UG/DL

## 2025-03-14 ENCOUNTER — OFFICE VISIT (OUTPATIENT)
Dept: OBGYN CLINIC | Facility: CLINIC | Age: 40
End: 2025-03-14
Payer: COMMERCIAL

## 2025-03-14 ENCOUNTER — PATIENT MESSAGE (OUTPATIENT)
Dept: OBGYN CLINIC | Facility: CLINIC | Age: 40
End: 2025-03-14

## 2025-03-14 VITALS
BODY MASS INDEX: 49 KG/M2 | SYSTOLIC BLOOD PRESSURE: 122 MMHG | DIASTOLIC BLOOD PRESSURE: 80 MMHG | HEART RATE: 92 BPM | WEIGHT: 293 LBS

## 2025-03-14 DIAGNOSIS — R73.02 IGT (IMPAIRED GLUCOSE TOLERANCE): ICD-10-CM

## 2025-03-14 DIAGNOSIS — N76.0 VAGINITIS AND VULVOVAGINITIS: ICD-10-CM

## 2025-03-14 DIAGNOSIS — Z30.432 ENCOUNTER FOR REMOVAL OF INTRAUTERINE CONTRACEPTIVE DEVICE: Primary | ICD-10-CM

## 2025-03-14 PROCEDURE — 3079F DIAST BP 80-89 MM HG: CPT | Performed by: OBSTETRICS & GYNECOLOGY

## 2025-03-14 PROCEDURE — 3074F SYST BP LT 130 MM HG: CPT | Performed by: OBSTETRICS & GYNECOLOGY

## 2025-03-14 PROCEDURE — 58301 REMOVE INTRAUTERINE DEVICE: CPT | Performed by: OBSTETRICS & GYNECOLOGY

## 2025-03-14 PROCEDURE — 81514 NFCT DS BV&VAGINITIS DNA ALG: CPT | Performed by: OBSTETRICS & GYNECOLOGY

## 2025-03-14 RX ORDER — METFORMIN HYDROCHLORIDE 500 MG/1
500 TABLET, EXTENDED RELEASE ORAL DAILY
Qty: 30 TABLET | Refills: 0 | Status: SHIPPED | OUTPATIENT
Start: 2025-03-14

## 2025-03-14 NOTE — PROCEDURES
Procedure Note: IUD removal     Preoperative Diagnosis:  IUD in place     Postoperative Diagnosis:  S/p IUD removal     Indications:  40 year old y/o  female with IUD in placed since 2012 who presents for IUD removal per patient request.     Procedure:    A discussion was held with the patient about risks, benefits and alternatives for the procedure. The patient verbalized understanding and requested IUD removal. All questions and concerns addressed. Verbal and written consent was obtained.     The patient was placed in dorsal position with heels secured in stirrups. A sterile speculum was placed in the vagina. The cervix was visualized with 2 visible IUD strings noted from at the external os. Sterile ring forceps were then advanced towards the cervix and used to grasp the IUD strings. The IUD was then removed with the sterile forceps without difficulty or complications. The IUD appeared intact and was shown to the patient prior to be properly discarded. The patient reported she was doing well. All instrument were then removed from the vagina. The patient was advised Ibuprofen PRN for pain. Precautions provided to the patient. As per previous in-office discussion, plan to remove IUD and observe bleeding pattern. If continued heavy bleeding 1-3 months post-IUD removal, would recommend obtaining endometrial biopsy prior to management with progesterone therapy (repeat IUD placement vs. Medication management). Patient voiced understanding. Plan for 3-6 month follow up appointment.     Condition: Stable    Disposition: RTC in 1 year or sooner    Humberto Hopkins MD   [facility] - [facility]      Note to patient and family   The 21st Century Cures Act makes medical notes available to patients in the interest of transparency.  However, please be advised that this is a medical document.  It is intended as [id number] communication.  It is written and medical language may contain abbreviations or verbiage that are technical and  unfamiliar.  It may appear blunt or direct.  Medical documents are intended to carry relevant information, facts as evident, and the clinical opinion of the practitioner.

## 2025-03-15 ENCOUNTER — PATIENT MESSAGE (OUTPATIENT)
Dept: FAMILY MEDICINE CLINIC | Facility: CLINIC | Age: 40
End: 2025-03-15

## 2025-03-15 DIAGNOSIS — E11.9 CONTROLLED TYPE 2 DIABETES MELLITUS WITHOUT COMPLICATION, WITHOUT LONG-TERM CURRENT USE OF INSULIN (HCC): Primary | ICD-10-CM

## 2025-03-15 LAB
BV BACTERIA DNA VAG QL NAA+PROBE: POSITIVE
C GLABRATA DNA VAG QL NAA+PROBE: NEGATIVE
C KRUSEI DNA VAG QL NAA+PROBE: NEGATIVE
CANDIDA DNA VAG QL NAA+PROBE: NEGATIVE
T VAGINALIS DNA VAG QL NAA+PROBE: NEGATIVE

## 2025-03-15 RX ORDER — METRONIDAZOLE 500 MG/1
500 TABLET ORAL 2 TIMES DAILY
Qty: 14 TABLET | Refills: 0 | Status: SHIPPED | OUTPATIENT
Start: 2025-03-15 | End: 2025-03-22

## 2025-03-20 ENCOUNTER — TELEPHONE (OUTPATIENT)
Dept: FAMILY MEDICINE CLINIC | Facility: CLINIC | Age: 40
End: 2025-03-20

## 2025-03-21 RX ORDER — CHLORTHALIDONE 25 MG/1
25 TABLET ORAL DAILY
Qty: 90 TABLET | Refills: 1 | Status: SHIPPED | OUTPATIENT
Start: 2025-03-21

## 2025-04-17 DIAGNOSIS — R73.02 IGT (IMPAIRED GLUCOSE TOLERANCE): ICD-10-CM

## 2025-04-17 RX ORDER — METFORMIN HYDROCHLORIDE 500 MG/1
500 TABLET, EXTENDED RELEASE ORAL DAILY
Qty: 30 TABLET | Refills: 0 | Status: SHIPPED | OUTPATIENT
Start: 2025-04-17

## 2025-04-17 NOTE — TELEPHONE ENCOUNTER
Diabetes Medication Protocol Passed04/17/2025 05:31 AM   Protocol Details Last A1C < 7.5 and within past 6 months    In person appointment or virtual visit in the past 6 mos or appointment in next 3 mos    Microalbumin procedure in past 12 months or taking ACE/ARB    EGFRCR or GFRNAA > 50    GFR in the past 12 months    Medication is active on med list

## 2025-05-05 NOTE — TELEPHONE ENCOUNTER
Diabetes Medication Protocol Passed03/14/2025 05:31 AM   Protocol Details Last A1C < 7.5 and within past 6 months    In person appointment or virtual visit in the past 6 mos or appointment in next 3 mos    Microalbumin procedure in past 12 months or taking ACE/ARB    EGFRCR or GFRNAA > 50    GFR in the past 12 months    Medication is active on med list     
[facility] provides services at a reduced cost to those who are determined to be eligible through [facility]’s financial assistance program. Information regarding [facility]’s financial assistance program can be found by going to [email or web address] or by calling [phone].

## 2025-05-15 ENCOUNTER — OFFICE VISIT (OUTPATIENT)
Dept: FAMILY MEDICINE CLINIC | Facility: CLINIC | Age: 40
End: 2025-05-15
Payer: COMMERCIAL

## 2025-05-15 VITALS
HEART RATE: 100 BPM | BODY MASS INDEX: 46.53 KG/M2 | RESPIRATION RATE: 18 BRPM | TEMPERATURE: 98 F | SYSTOLIC BLOOD PRESSURE: 118 MMHG | HEIGHT: 66.5 IN | WEIGHT: 293 LBS | DIASTOLIC BLOOD PRESSURE: 78 MMHG | OXYGEN SATURATION: 98 %

## 2025-05-15 DIAGNOSIS — R09.81 SINUS CONGESTION: Primary | ICD-10-CM

## 2025-05-15 DIAGNOSIS — J02.9 SORE THROAT: ICD-10-CM

## 2025-05-15 LAB
CONTROL LINE PRESENT WITH A CLEAR BACKGROUND (YES/NO): YES YES/NO
KIT LOT #: NORMAL NUMERIC
STREP GRP A CUL-SCR: NEGATIVE

## 2025-05-15 PROCEDURE — 3008F BODY MASS INDEX DOCD: CPT | Performed by: NURSE PRACTITIONER

## 2025-05-15 PROCEDURE — 99213 OFFICE O/P EST LOW 20 MIN: CPT | Performed by: NURSE PRACTITIONER

## 2025-05-15 PROCEDURE — 87880 STREP A ASSAY W/OPTIC: CPT | Performed by: NURSE PRACTITIONER

## 2025-05-15 PROCEDURE — 3074F SYST BP LT 130 MM HG: CPT | Performed by: NURSE PRACTITIONER

## 2025-05-15 PROCEDURE — 3078F DIAST BP <80 MM HG: CPT | Performed by: NURSE PRACTITIONER

## 2025-05-15 RX ORDER — FLUCONAZOLE 150 MG/1
150 TABLET ORAL ONCE
Qty: 2 TABLET | Refills: 0 | Status: SHIPPED | OUTPATIENT
Start: 2025-05-15 | End: 2025-05-15

## 2025-05-15 NOTE — PATIENT INSTRUCTIONS
1. Rest. Drink plenty of fluids.  2. Supportive care as discussed.   3. Salt water gargles three times daily  4. If symptoms no improving in the next 3 days then start augmentin as prescribed.  5. The rapid strep test was negative today.   6.Follow up with PMD in 4-5 days for re-eval. Go to the emergency department immediately if symptoms worsen, change, you develop chest discomfort, wheezing, shortness of breath, or if you have any concerns.

## 2025-05-15 NOTE — PROGRESS NOTES
CHIEF COMPLAINT:     Chief Complaint   Patient presents with    Sinus Problem     Sinus congestion/pressure, teeth pain, sore throat,       HPI:   Samara Jade is a 40 year old female who presents for sinus congestion/pressure, sore throat and teeth pain along maxillary jawline.  Patient reports symptoms present x 5 days.  Denies any fevers, wheezing, chest discomfort, or shortness of breath. .  Treating symptoms with otc cold meds.   Tolerates PO well at home. No n/v/d.  Denies any other aggravating or relieving factors at home. Denies any other treatment attempts prior to arrival.   Notes hx of sinus infections that require abx and steroids.    Current Medications[1]   Past Medical History[2]   Past Surgical History[3]      Short Social Hx on File[4]      REVIEW OF SYSTEMS:   GENERAL: Denies fever. Notes good appetite  SKIN: no rashes or abnormal skin lesions  HEENT: + sore throat, + sinus symptoms, [place] ear pain  LUNGS: + occasional nonproductive cough, denies shortness of breath or wheezing,   CARDIOVASCULAR: denies chest pain or palpitations   GI: denies N/V/C or abdominal pain  NEURO: Denies headaches    EXAM:   /78   Pulse 100   Temp 97.9 °F (36.6 °C)   Resp 18   Ht 5' 6.5\" (1.689 m)   Wt 297 lb (134.7 kg)   LMP 04/21/2025 (Exact Date)   SpO2 98%   BMI 47.22 kg/m²     GENERAL: well developed, well nourished,in no apparent distress  SKIN: no rashes,no suspicious lesions  HEAD: atraumatic, normocephalic.    EYES: conjunctiva clear, no redness, itching, or discharge. No erythema or swelling to periorbital regions.  EARS: TM's intact and without erythema, no bulging, no retraction, appear dusky in color, bony landmarks visualized. No erythema or swelling noted to ear canals or external ears.   NOSE: Nostrils patent, dried yellow nasal mucous, nasal mucosa reddened   THROAT: Oral mucosa pink, moist. Posterior pharynx is erythematous. No exudates. No tonsillar hypertrophy noted.  No trismus.  Uvula midline with no swelling. Voice clear/normal. No stridor  NECK: Supple, non-tender. No gingival erythema or swelling.  LUNGS: clear to auscultation bilaterally, no rales, wheezes or rhonchi. Breathing is non labored.  CARDIO: RRR without murmur  EXTREMITIES: no cyanosis, clubbing or edema  LYMPH:  No lymphadenopathy.        ASSESSMENT AND PLAN:       ICD-10-CM    1. Sinus congestion  R09.81 amoxicillin clavulanate 875-125 MG Oral Tab      2. Sore throat  J02.9 Rapid Strep        Discussed physical exam and hpi with pt. Pt has reassuring physical exam consistent with sinus congestion and pharyngitis. No signs of pta or retropharyngeal infection. We discussed viral vs allergy vs bacterial etiologies associated with sinusitis. Discussed likely to early for bacterial sinusitis. Pt agreeable delayed antimicrobial therapy option. Treatment options discussed with patient and explained in detail. We reviewed symptomatic care at home. If symptoms not improving then will start augmentin as prescribed. (Also provided back up script for diflucan if she does end up using augmentin. Pt notes abx often give her vaginal yeast infections.). The risks, benefits and potential side effects of possible medications were reviewed. Alternatives were discussed. Monitoring parameters and expected course outlined. Patient to call PCP or go to emergency department if symptoms fail to respond as outlined, or worsen in any way. The patient agreed with the plan.     Patient Instructions   1. Rest. Drink plenty of fluids.  2. Supportive care as discussed.   3. Salt water gargles three times daily  4. If symptoms no improving in the next 3 days then start augmentin as prescribed.  5. The rapid strep test was negative today.   6.Follow up with PMD in 4-5 days for re-eval. Go to the emergency department immediately if symptoms worsen, change, you develop chest discomfort, wheezing, shortness of breath, or if you have any concerns.              [1]   Current Outpatient Medications   Medication Sig Dispense Refill    amoxicillin clavulanate 875-125 MG Oral Tab Take 1 tablet by mouth 2 (two) times daily for 10 days. 20 tablet 0    fluconazole (DIFLUCAN) 150 MG Oral Tab Take 1 tablet (150 mg total) by mouth once for 1 dose. Repeat in 3 days if needed. 2 tablet 0    METFORMIN  MG Oral Tablet 24 Hr Take 1 tablet by mouth once daily 30 tablet 0    chlorthalidone 25 MG Oral Tab Take 1 tablet (25 mg total) by mouth daily. 90 tablet 1    semaglutide-weight management 1 MG/0.5ML Subcutaneous Solution Auto-injector Inject 0.5 mL (1 mg total) into the skin once a week for 12 doses. 6 mL 0    atorvastatin 20 MG Oral Tab Take 1 tablet (20 mg total) by mouth daily. 90 tablet 0    lisinopril 30 MG Oral Tab Take 1 tablet (30 mg total) by mouth daily. 90 tablet 0    Blood Pressure Does not apply Kit Use daily as needed 1 kit 0   [2]   Past Medical History:   Hypertension   [3]   Past Surgical History:  Procedure Laterality Date    Tonsillectomy     [4]   Social History  Socioeconomic History    Marital status:    Tobacco Use    Smoking status: Former     Types: Cigarettes     Passive exposure: Never    Smokeless tobacco: Never    Tobacco comments:     Quit 10 years ago   Vaping Use    Vaping status: Former   Substance and Sexual Activity    Alcohol use: Yes     Comment: socially    Drug use: Never    Sexual activity: Yes     Partners: Female     Birth control/protection: I.U.D.   Other Topics Concern    Caffeine Concern Yes    Exercise Yes    Seat Belt Yes

## 2025-05-16 ENCOUNTER — LABORATORY ENCOUNTER (OUTPATIENT)
Dept: LAB | Age: 40
End: 2025-05-16
Attending: INTERNAL MEDICINE
Payer: COMMERCIAL

## 2025-05-16 DIAGNOSIS — R80.9 PROTEINURIA, UNSPECIFIED TYPE: ICD-10-CM

## 2025-05-16 LAB
ANION GAP SERPL CALC-SCNC: 10 MMOL/L (ref 0–18)
BASOPHILS # BLD AUTO: 0.03 X10(3) UL (ref 0–0.2)
BASOPHILS NFR BLD AUTO: 0.4 %
BILIRUB UR QL STRIP.AUTO: NEGATIVE
BUN BLD-MCNC: 12 MG/DL (ref 9–23)
CALCIUM BLD-MCNC: 10 MG/DL (ref 8.7–10.6)
CHLORIDE SERPL-SCNC: 101 MMOL/L (ref 98–112)
CLARITY UR REFRACT.AUTO: CLEAR
CO2 SERPL-SCNC: 25 MMOL/L (ref 21–32)
COLOR UR AUTO: YELLOW
CREAT BLD-MCNC: 0.73 MG/DL (ref 0.55–1.02)
CREAT UR-SCNC: 182.8 MG/DL
EGFRCR SERPLBLD CKD-EPI 2021: 108 ML/MIN/1.73M2 (ref 60–?)
EOSINOPHIL # BLD AUTO: 0.12 X10(3) UL (ref 0–0.7)
EOSINOPHIL NFR BLD AUTO: 1.4 %
ERYTHROCYTE [DISTWIDTH] IN BLOOD BY AUTOMATED COUNT: 13.4 %
FASTING STATUS PATIENT QL REPORTED: YES
GLUCOSE BLD-MCNC: 78 MG/DL (ref 70–99)
GLUCOSE UR STRIP.AUTO-MCNC: NORMAL MG/DL
HCT VFR BLD AUTO: 42.9 % (ref 35–48)
HGB BLD-MCNC: 13.9 G/DL (ref 12–16)
HYALINE CASTS #/AREA URNS AUTO: PRESENT /LPF
IMM GRANULOCYTES # BLD AUTO: 0.05 X10(3) UL (ref 0–1)
IMM GRANULOCYTES NFR BLD: 0.6 %
KETONES UR STRIP.AUTO-MCNC: NEGATIVE MG/DL
LEUKOCYTE ESTERASE UR QL STRIP.AUTO: NEGATIVE
LYMPHOCYTES # BLD AUTO: 1.28 X10(3) UL (ref 1–4)
LYMPHOCYTES NFR BLD AUTO: 15.2 %
MAGNESIUM SERPL-MCNC: 1.8 MG/DL (ref 1.6–2.6)
MCH RBC QN AUTO: 30.1 PG (ref 26–34)
MCHC RBC AUTO-ENTMCNC: 32.4 G/DL (ref 31–37)
MCV RBC AUTO: 92.9 FL (ref 80–100)
MONOCYTES # BLD AUTO: 0.59 X10(3) UL (ref 0.1–1)
MONOCYTES NFR BLD AUTO: 7 %
NEUTROPHILS # BLD AUTO: 6.36 X10 (3) UL (ref 1.5–7.7)
NEUTROPHILS # BLD AUTO: 6.36 X10(3) UL (ref 1.5–7.7)
NEUTROPHILS NFR BLD AUTO: 75.4 %
NITRITE UR QL STRIP.AUTO: NEGATIVE
OSMOLALITY SERPL CALC.SUM OF ELEC: 281 MOSM/KG (ref 275–295)
PH UR STRIP.AUTO: 5.5 [PH] (ref 5–8)
PHOSPHATE SERPL-MCNC: 3.4 MG/DL (ref 2.4–5.1)
PLATELET # BLD AUTO: 273 10(3)UL (ref 150–450)
POTASSIUM SERPL-SCNC: 3.7 MMOL/L (ref 3.5–5.1)
PROT UR STRIP.AUTO-MCNC: 70 MG/DL
PROT UR-MCNC: 88.5 MG/DL (ref ?–14)
RBC # BLD AUTO: 4.62 X10(6)UL (ref 3.8–5.3)
SODIUM SERPL-SCNC: 136 MMOL/L (ref 136–145)
SP GR UR STRIP.AUTO: 1.02 (ref 1–1.03)
UROBILINOGEN UR STRIP.AUTO-MCNC: NORMAL MG/DL
WBC # BLD AUTO: 8.4 X10(3) UL (ref 4–11)

## 2025-05-16 PROCEDURE — 83735 ASSAY OF MAGNESIUM: CPT

## 2025-05-16 PROCEDURE — 84100 ASSAY OF PHOSPHORUS: CPT

## 2025-05-16 PROCEDURE — 85025 COMPLETE CBC W/AUTO DIFF WBC: CPT

## 2025-05-16 PROCEDURE — 36415 COLL VENOUS BLD VENIPUNCTURE: CPT

## 2025-05-16 PROCEDURE — 84156 ASSAY OF PROTEIN URINE: CPT

## 2025-05-16 PROCEDURE — 82570 ASSAY OF URINE CREATININE: CPT

## 2025-05-16 PROCEDURE — 81001 URINALYSIS AUTO W/SCOPE: CPT

## 2025-05-16 PROCEDURE — 80048 BASIC METABOLIC PNL TOTAL CA: CPT

## 2025-05-17 ENCOUNTER — HOSPITAL ENCOUNTER (OUTPATIENT)
Age: 40
Discharge: HOME | End: 2025-05-17
Payer: COMMERCIAL

## 2025-05-17 VITALS
TEMPERATURE: 99 F | WEIGHT: 293 LBS | DIASTOLIC BLOOD PRESSURE: 86 MMHG | HEIGHT: 66 IN | RESPIRATION RATE: 18 BRPM | SYSTOLIC BLOOD PRESSURE: 147 MMHG | OXYGEN SATURATION: 95 % | BODY MASS INDEX: 47.09 KG/M2 | HEART RATE: 99 BPM

## 2025-05-17 DIAGNOSIS — B02.9 HERPES ZOSTER WITHOUT COMPLICATION: Primary | ICD-10-CM

## 2025-05-17 RX ORDER — LIDOCAINE HYDROCHLORIDE 20 MG/ML
5 SOLUTION OROPHARYNGEAL
Qty: 60 ML | Refills: 0 | Status: SHIPPED | OUTPATIENT
Start: 2025-05-17

## 2025-05-17 RX ORDER — VALACYCLOVIR HYDROCHLORIDE 1 G/1
1000 TABLET, FILM COATED ORAL 3 TIMES DAILY
Qty: 21 TABLET | Refills: 0 | Status: SHIPPED | OUTPATIENT
Start: 2025-05-17 | End: 2025-05-24

## 2025-05-17 RX ORDER — METHYLPREDNISOLONE 4 MG/1
TABLET ORAL
Qty: 1 EACH | Refills: 0 | Status: SHIPPED | OUTPATIENT
Start: 2025-05-17

## 2025-05-17 RX ORDER — LIDOCAINE HYDROCHLORIDE 20 MG/ML
5 SOLUTION OROPHARYNGEAL
Qty: 60 ML | Refills: 0 | Status: SHIPPED | OUTPATIENT
Start: 2025-05-17 | End: 2025-05-17

## 2025-05-17 NOTE — ED NOTES
Patient called back back and discussed medication message. Patient would like the rx sent to [facility] on file. Rx sent by provider.

## 2025-05-17 NOTE — ED INITIAL ASSESSMENT (HPI)
Pt with right side burning of her scalp, face, ear, and throat that started on Thursday. Pt also notes sores inside her mouth to the right side only.     Pt started Augmentin on Thursday for possible sinus infection without improvement

## 2025-05-17 NOTE — DISCHARGE INSTRUCTIONS
Your dose of acetaminophen (Tylenol) is 1000 mg every 4 hours for pain or fevers as needed.   Increase water intake 2-3 liters daily   Replace your toothbrush

## 2025-05-17 NOTE — ED PROVIDER NOTES
Patient Seen in: [facility]      History     Chief Complaint   Patient presents with    Burning Tongue    Sore Throat     Stated Complaint: painful face rash/hurts to swallow    Subjective:   HPI  History of Present Illness            40-year-old female with dyslipidemia and hypertension presents today with complaints of right sided facial pain and sores in her mouth.  Patient states her symptoms started on Tuesday and she was seen by her doctor and was diagnosed with a sinus infection.  Patient states she was placed on Augmentin but the pain is very sharp and is now in her scalp.  Patient states she has noticed vesicular lesions in her mouth.  Patient states she has had chickenpox as a child.      Objective:     Past Medical History:    Hyperlipidemia    Hypertension              Past Surgical History:   Procedure Laterality Date    Tonsillectomy                  Social History     Socioeconomic History    Marital status:    Tobacco Use    Smoking status: Former     Types: Cigarettes     Passive exposure: Never    Smokeless tobacco: Never    Tobacco comments:     Quit 10 years ago   Vaping Use    Vaping status: Former   Substance and Sexual Activity    Alcohol use: Yes     Comment: socially    Drug use: Never    Sexual activity: Yes     Partners: Female     Birth control/protection: I.U.D.   Other Topics Concern    Caffeine Concern Yes    Exercise Yes    Seat Belt Yes              Review of Systems   Constitutional: Negative.    HENT:  Positive for mouth sores, rhinorrhea and sinus pain.    Eyes: Negative.    Respiratory: Negative.     Cardiovascular: Negative.    Gastrointestinal: Negative.    Endocrine: Negative.    Genitourinary: Negative.    Musculoskeletal: Negative.    Skin: Negative.    Allergic/Immunologic: Negative.    Neurological: Negative.    Hematological: Negative.    Psychiatric/Behavioral: Negative.         Positive for stated complaint: painful face rash/hurts to swallow  Other  systems are as noted in HPI.  Constitutional and vital signs reviewed.      All other systems reviewed and negative except as noted above.                  Physical Exam     ED Triage Vitals [05/17/25 0824]   /86   Pulse 99   Resp 18   Temp 98.5 °F (36.9 °C)   Temp src Oral   SpO2 95 %   O2 Device None (Room air)       Current Vitals:   Vital Signs  BP: 147/86  Pulse: 99  Resp: 18  Temp: 98.5 °F (36.9 °C)  Temp src: Oral    Oxygen Therapy  SpO2: 95 %  O2 Device: None (Room air)          Physical Exam  Vitals and nursing note reviewed.   Constitutional:       Appearance: She is well-developed and normal weight.   HENT:      Head: Normocephalic and atraumatic.      Comments: No obvious lesions appreciated to the scalp.     Right Ear: Tympanic membrane and ear canal normal.      Left Ear: Tympanic membrane and ear canal normal.      Nose: Congestion and rhinorrhea present.      Mouth/Throat:      Mouth: Mucous membranes are moist. Oral lesions present.      Pharynx: Posterior oropharyngeal erythema present.      Tonsils: No tonsillar exudate or tonsillar abscesses.      Comments: Vesicular lesions appreciated to the top of the mouth and right side buccal region.  Eyes:      Conjunctiva/sclera: Conjunctivae normal.      Pupils: Pupils are equal, round, and reactive to light.   Cardiovascular:      Rate and Rhythm: Normal rate and regular rhythm.      Heart sounds: Normal heart sounds.   Pulmonary:      Effort: Pulmonary effort is normal.      Breath sounds: Normal breath sounds.   Musculoskeletal:      Cervical back: Normal range of motion and neck supple.   Skin:     General: Skin is warm and dry.      Capillary Refill: Capillary refill takes less than 2 seconds.   Neurological:      General: No focal deficit present.      Mental Status: She is alert.   Psychiatric:         Mood and Affect: Mood normal.                 ED Course   Labs Reviewed - No data to display       Results                              MDM       40-year-old female with dyslipidemia and hypertension presents today with complaints of right sided facial pain and sores in her mouth.  Patient states her symptoms started on Tuesday and she was seen by her doctor and was diagnosed with a sinus infection.  Patient states she was placed on Augmentin but the pain is very sharp and is now in her scalp.  Patient states she has noticed vesicular lesions in her mouth.  Patient states she has had chickenpox as a child.  Vital signs: Please see EMR.  Physical exam: Please see exam.  Differential diagnosis: Strep throat, sinusitis, shingles, trigeminal neuralgia.  Based on physical exam and HPI will diagnosed with shingles.  Patient was prescribed Valtrex, Medrol Dosepak and viscous lidocaine.  Patient instructed to follow-up with primary care provider.  ED precautions given.        Medical Decision Making  40-year-old female with dyslipidemia and hypertension presents today with complaints of right sided facial pain and sores in her mouth.  Patient states her symptoms started on Tuesday and she was seen by her doctor and was diagnosed with a sinus infection.  Patient states she was placed on Augmentin but the pain is very sharp and is now in her scalp.  Patient states she has noticed vesicular lesions in her mouth.  Patient states she has had chickenpox as a child.    Problems Addressed:  Herpes zoster without complication: acute illness or injury    Risk  OTC drugs.  Prescription drug management.          Disposition and Plan     Clinical Impression:  1. Herpes zoster without complication         Disposition:  Discharge  5/17/2025  8:33 am    Follow-up:  Belinda Day MD  [address]  [facility] [address]  [phone]    Schedule an appointment as soon as possible for a visit             Medications Prescribed:  Current Discharge Medication List        START taking these medications    Details   valACYclovir 1 G Oral Tab Take 1 tablet (1,000 mg  total) by mouth 3 (three) times daily for 7 days.  Qty: 21 tablet, Refills: 0      methylPREDNISolone (MEDROL) 4 MG Oral Tablet Therapy Pack Dosepack: take as directed  Qty: 1 each, Refills: 0      Lidocaine Viscous HCl 2 % Mouth/Throat Solution Take 5 mL by mouth every 3 (three) hours as needed for Pain.  Qty: 60 mL, Refills: 0                   Supplementary Documentation:

## 2025-05-17 NOTE — ED NOTES
Pharmacy calling and states they do not carry the 60 ml bottles of the Lidocaine viscous. Verbal order given to change the the rx to 100 ml.     Called and left a message for the patient. Patient will need to contact a different pharmacy and see if it's stocked.

## 2025-05-19 ENCOUNTER — TELEPHONE (OUTPATIENT)
Dept: FAMILY MEDICINE CLINIC | Facility: CLINIC | Age: 40
End: 2025-05-19

## 2025-05-19 NOTE — TELEPHONE ENCOUNTER
Please see note below    Reviewed patient's chart -   [facility] visit 05/17/25 - Dx herpes zoster; Rx'd valcyclovir, medrol pack, lidocaine mouth solution    Patient has follow-up appt made for tomorrow 05/20/25  Ok for patient to keep appt? Or need to change to video visit?    Please advise, thank you

## 2025-05-19 NOTE — TELEPHONE ENCOUNTER
Pt made a MyChart appt for tomorrow for shingles f/u with shingles diagnosis of 5/17/15.  Can you please triage?  Thank you!    Future Appointments   Date Time Provider Department Center   5/20/2025 11:00 AM Belinda Day MD [facility] [place]   5/20/2025  4:00 PM Seferino Powers MD [facility] [address]   6/18/2025  7:30 AM Humberto Hopkins MD [facility] [place]   7/3/2025  8:30 AM Belinda Day MD [facility]

## 2025-05-19 NOTE — TELEPHONE ENCOUNTER
Per Dr. Cano: \"Need in person\"    Ok to keep appt.    Future Appointments   Date Time Provider Department Center   5/20/2025 11:00 AM Belinda Day MD [facility] [place]   5/20/2025  4:00 PM Seferino Powers MD [facility] [address]   6/18/2025  7:30 AM Humberto Hopkins MD [facility] OB[facility]N [facility] [place]   7/3/2025  8:30 AM Belinda Day MD [facility] [place]

## 2025-05-20 ENCOUNTER — OFFICE VISIT (OUTPATIENT)
Dept: FAMILY MEDICINE CLINIC | Facility: CLINIC | Age: 40
End: 2025-05-20
Payer: COMMERCIAL

## 2025-05-20 ENCOUNTER — TELEMEDICINE (OUTPATIENT)
Dept: NEPHROLOGY | Facility: CLINIC | Age: 40
End: 2025-05-20
Payer: COMMERCIAL

## 2025-05-20 VITALS
SYSTOLIC BLOOD PRESSURE: 128 MMHG | DIASTOLIC BLOOD PRESSURE: 84 MMHG | HEART RATE: 112 BPM | OXYGEN SATURATION: 98 % | RESPIRATION RATE: 22 BRPM | WEIGHT: 289 LBS | BODY MASS INDEX: 45.9 KG/M2 | HEIGHT: 66.5 IN | TEMPERATURE: 98 F

## 2025-05-20 DIAGNOSIS — B02.8 HERPES ZOSTER WITH COMPLICATION: ICD-10-CM

## 2025-05-20 DIAGNOSIS — E11.9 CONTROLLED TYPE 2 DIABETES MELLITUS WITHOUT COMPLICATION, WITHOUT LONG-TERM CURRENT USE OF INSULIN (HCC): ICD-10-CM

## 2025-05-20 DIAGNOSIS — R80.9 PROTEINURIA, UNSPECIFIED TYPE: Primary | ICD-10-CM

## 2025-05-20 DIAGNOSIS — R73.02 IGT (IMPAIRED GLUCOSE TOLERANCE): ICD-10-CM

## 2025-05-20 DIAGNOSIS — Z00.00 WELL ADULT EXAM: Primary | ICD-10-CM

## 2025-05-20 LAB — HEMOGLOBIN A1C: 5.7 % (ref 4.3–5.6)

## 2025-05-20 PROCEDURE — 98005 SYNCH AUDIO-VIDEO EST LOW 20: CPT | Performed by: INTERNAL MEDICINE

## 2025-05-20 RX ORDER — FLUCONAZOLE 150 MG/1
TABLET ORAL
COMMUNITY
Start: 2025-05-15

## 2025-05-20 RX ORDER — GABAPENTIN 100 MG/1
100 CAPSULE ORAL 3 TIMES DAILY
Qty: 30 CAPSULE | Refills: 0 | Status: SHIPPED | OUTPATIENT
Start: 2025-05-20

## 2025-05-20 RX ORDER — METFORMIN HYDROCHLORIDE 500 MG/1
500 TABLET, EXTENDED RELEASE ORAL DAILY
Qty: 90 TABLET | Refills: 0 | Status: SHIPPED | OUTPATIENT
Start: 2025-05-20

## 2025-05-20 RX ORDER — METFORMIN HYDROCHLORIDE 500 MG/1
500 TABLET, EXTENDED RELEASE ORAL DAILY
Qty: 30 TABLET | Refills: 0 | OUTPATIENT
Start: 2025-05-20

## 2025-05-20 NOTE — TELEPHONE ENCOUNTER
Diabetes Medication Protocol [id number]/20/2025 05:33 AM   Protocol Details Last A1C < 7.5 and within past 6 months    In person appointment or virtual visit in the past 6 mos or appointment in next 3 mos    Microalbumin procedure in past 12 months or taking ACE/ARB    EGFRCR or GFRNAA > 50    GFR in the past 12 months          Last refill:   Medication Quantity Refills Start End   METFORMIN  MG Oral Tablet 24 Hr 30 tablet 0 4/17/2025      Last Visit: 11/27/24    Next Visit:   Future Appointments   Date Time Provider Department Center   5/20/2025 11:00 AM Belinda Day MD [facility] [place]   5/20/2025  4:00 PM Seferino Powers MD [facility] [address]   6/18/2025  7:30 AM Humberto Hopkins MD [facility] [place]   7/3/2025  8:30 AM Belinda Day MD [facility] [place]         Forward to Dr. Cano please advise on refills. Thanks.

## 2025-05-20 NOTE — PROGRESS NOTES
Chief Complaint   Patient presents with    Shingles    Medication Follow-Up     Needs refill for Metformin and PA for wegovy       HPI  Patient is here for wellness and diabetic follow up and refill on her Wegovy. She is doing weill with weight loss. She reports that she has being diagnosed with shingles and her pain is awful. She is unable to shower due to pain on the right side of her scalp and not her face and teeth. Her is red but no blurred vision or pain. She has not had an eye exam done to rule out ocular involvment    ROS  As per HPI and all other systems reviewed and are negative        Past Medical History[1]    Past Surgical History[2]    Social Hx on file[3]    Family History[4]     Medications Ordered Prior to Encounter[5]      Objective  Vitals:    05/20/25 1100   BP: 128/84   Pulse: 112   Resp: 22   Temp: 98 °F (36.7 °C)   TempSrc: Temporal   SpO2: 98%   Weight: 289 lb (131.1 kg)   Height: 5' 6.5\" (1.689 m)         Body mass index is 45.95 kg/m².    Physical Exam  Constitutional:       Appearance: Normal appearance.   HEENT:      Head: Normocephalic and atraumatic.      Eyes: PERRLA no notable nystagmus     Ears: normal on observation     Nose: Nose normal.      Mouth: Mucous membranes are moist.      Neck: no masses no bruit  Cardiovascular:      Rate and Rhythm: Normal rate and regular rhythm.   Pulmonary:      Effort: Pulmonary effort is normal.      Breath sounds: Normal breath sounds.   Abdominal:      General: Bowel sounds are normal.      Palpations: Abdomen is soft. There is no mass.   Musculoskeletal:         General: Normal range of motion.      Cervical back: Normal range of motion.   Skin:     General: Skin is warm and dry. Erythema of the right side of face with watering of her right eye but no notable lesions  Neurological:      General: No focal deficit present.      Mental Status: She is alert and oriented to person, place, and time.   Psychiatric:         Mood and Affect: Mood normal.          Thought Content: Thought content normal.     Assessment and Plan    ICD-10-CM    1. Well adult exam  Z00.00 TSH and Free T4     Lipid Panel      2. Herpes zoster with complication  B02.8 gabapentin 100 MG Oral Cap     Ophthalmology Referral - In Network      3. Controlled type 2 diabetes mellitus without complication, without long-term current use of insulin (HCC)  E11.9 POC Hemoglobin A1C     Microalb/Creat Ratio, Random Urine [E]     metFORMIN  MG Oral Tablet 24 Hr                Follow up  No follow-ups on file.      Patient Instructions  There are no Patient Instructions on file for this visit.       Belinda aDy MD       This note was created by Dragon voice recognition. Errors in content may be related to improper recognition by the system; efforts to review and correct have been done but errors may still exist. Please be advised the primary purpose of this note is for me to communicate medical care. Standard sentence structure is not always used. Medical terminology and medical abbreviations may be used. There may be grammatical, typographical, and automated fill ins that may have errors missed in proofreading.          [1]   Past Medical History:   Hyperlipidemia    Hypertension   [2]   Past Surgical History:  Procedure Laterality Date    Tonsillectomy     [3]   Social History  Socioeconomic History    Marital status:    Tobacco Use    Smoking status: Former     Types: Cigarettes     Passive exposure: Never    Smokeless tobacco: Never    Tobacco comments:     Quit 10 years ago   Vaping Use    Vaping status: Former   Substance and Sexual Activity    Alcohol use: Not Currently     Comment: socially    Drug use: Never    Sexual activity: Yes     Partners: Female     Birth control/protection: I.U.D.   Other Topics Concern    Caffeine Concern Yes    Exercise Yes    Seat Belt Yes   [4]   Family History  Problem Relation Age of Onset    Other (cervical cancer) Mother     Breast Cancer  Other    [5]   Current Outpatient Medications on File Prior to Visit   Medication Sig Dispense Refill    valACYclovir 1 G Oral Tab Take 1 tablet (1,000 mg total) by mouth 3 (three) times daily for 7 days. 21 tablet 0    methylPREDNISolone (MEDROL) 4 MG Oral Tablet Therapy Pack Dosepack: take as directed 1 each 0    Lidocaine Viscous HCl 2 % Mouth/Throat Solution Take 5 mL by mouth every 3 (three) hours as needed for Pain. 60 mL 0    chlorthalidone 25 MG Oral Tab Take 1 tablet (25 mg total) by mouth daily. 90 tablet 1    semaglutide-weight management 1 MG/0.5ML Subcutaneous Solution Auto-injector Inject 0.5 mL (1 mg total) into the skin once a week for 12 doses. 6 mL 0    atorvastatin 20 MG Oral Tab Take 1 tablet (20 mg total) by mouth daily. 90 tablet 0    lisinopril 30 MG Oral Tab Take 1 tablet (30 mg total) by mouth daily. 90 tablet 0    Blood Pressure Does not apply Kit Use daily as needed 1 kit 0    fluconazole 150 MG Oral Tab  (Patient not taking: Reported on 5/20/2025)       No current facility-administered medications on file prior to visit.

## 2025-05-20 NOTE — PROGRESS NOTES
[facility]  Nephrology Clinic Note        Telehealth outside of [facility]  Telehealth Verbal Consent   I conducted a telehealth visit with Samara Jade today, 5/20/25, which was completed using two-way, real-time interactive audio and video communication. This has been done in good james to provide continuity of care in the best interest of the provider-patient relationship, due to the COVID - public health crisis/national emergency where restrictions of face-to-face office visits are ongoing. Every conscious effort was taken to allow for sufficient and adequate time to complete the visit.  The patient was made aware of the limitations of the telehealth visit, including treatment limitations as no physical exam could be performed.  The patient was advised to call 911 or to go to the ER in case there was an emergency.  The patient was also advised of the potential privacy & security concerns related to the telehealth platform.   The patient was made aware of where to find [facility]'s notice of privacy practices, telehealth consent form and other related consent forms and documents.  which are located on the [facility] website. The patient verbally agreed to telehealth consent form, related consents and the risks discussed.    Lastly, the patient confirmed that they were in [place].   Included in this visit, time may have been spent reviewing labs, medications, radiology tests and decision making. Appropriate medical decision-making and tests are ordered as detailed in the plan of care above.  Coding/billing information is submitted for this visit based on complexity of care and/or time spent for the visit.  20m      History of Present Illness:  Samara Jaed is a a(n) 40 year old female with hx of DM, HTN (  dx and tx initiated in May 2024),        She is doing well  Reports BP is normal     Patient is otherwise in usual state of health  Denies any LE edema     No family hx of kidney diseaese; mother w/  ovarian/cervical ca w/ bladder invasion - s/p PNT  Sedentary lifestyle; works for [employer]           History:  Past Medical History:    Hyperlipidemia    Hypertension     Past Surgical History:   Procedure Laterality Date    Tonsillectomy       Family History   Problem Relation Age of Onset    Other (cervical cancer) Mother     Breast Cancer Other       reports that she has quit smoking. Her smoking use included cigarettes. She has never been exposed to tobacco smoke. She has never used smokeless tobacco. She reports that she does not currently use alcohol. She reports that she does not use drugs.    Allergies:  Allergies[1]    Current Medications:  No current facility-administered medications for this visit.  Home Medications:  Prior to Admission Medications   Medication Sig    fluconazole 150 MG Oral Tab  (Patient not taking: Reported on 5/20/2025)    gabapentin 100 MG Oral Cap Take 1 capsule (100 mg total) by mouth 3 (three) times daily.    metFORMIN  MG Oral Tablet 24 Hr Take 1 tablet (500 mg total) by mouth daily.    valACYclovir 1 G Oral Tab Take 1 tablet (1,000 mg total) by mouth 3 (three) times daily for 7 days.    methylPREDNISolone (MEDROL) 4 MG Oral Tablet Therapy Pack Dosepack: take as directed    Lidocaine Viscous HCl 2 % Mouth/Throat Solution Take 5 mL by mouth every 3 (three) hours as needed for Pain.    chlorthalidone 25 MG Oral Tab Take 1 tablet (25 mg total) by mouth daily.    semaglutide-weight management 1 MG/0.5ML Subcutaneous Solution Auto-injector Inject 0.5 mL (1 mg total) into the skin once a week for 12 doses.    atorvastatin 20 MG Oral Tab Take 1 tablet (20 mg total) by mouth daily.    lisinopril 30 MG Oral Tab Take 1 tablet (30 mg total) by mouth daily.    Blood Pressure Does not apply Kit Use daily as needed       Review of Systems:  See HPI; A total of 12 systems reviewed and otherwise unremarkable.       Psychiatric: Appropriate mood and affect.    Laboratory:    Labs reviewed      Imaging:  Reviewd     Impression:  Proteinuria - suspect related to DM/HTN; pt w/ new dx of DM about 6 mos ago. Other etiologies not ruled out however  -Serologic w/u is fairly unremarkable  - for now we plan to monitor ; reviewed role of bx  - continue ACEI  - reviewed role of sgt2i/kerendia- pt will consider and discus w/ primary - reviewed risk/benefits briefly   -hydrochlorothiazide 25 daily    HTN- as above; improved per pt  Obesity - reviewed role of diet/exercise as well as new meds such as ozempic (GLP1 inh)- also w/ reported benefit to renal issues overall.   HL - on statin    F/u  6 mos    I discussed care w/ pt only briefly- plan to call patient again tomorrow for more thorough discussion.     Thank you for allowing me to participate in this patient's care.  Please feel free to call me with any questions or concerns.    Seferino Powers MD  5/20/2025               [1]   Allergies  Allergen Reactions    Sulfa Antibiotics OTHER (SEE COMMENTS)     Pt stated her mother had told her this when she was young and is unaware if this allergy is true

## 2025-05-20 NOTE — TELEPHONE ENCOUNTER
Your Appointments      Tuesday May 20, 2025 11:00 AM  Office Visit with Belinda Day MD  [facility] ([facility]) [address] [facility] [address]  [phone]   Please arrive 15 minutes prior to your scheduled appointment. Please also bring your Insurance card, Photo ID, and your medication bottles or a list of your current medication.    If your condition improves and this appointment is no longer needed, please contact your physician office to cancel.    Please verify with your primary care provider if your insurance requires a referral.         Tuesday May 20, 2025 4:00 PM  Video Visit with Seferino Powers MD  [facility] ([address]) [address] [place] [id number] [phone]   Please verify your telehealth insurance benefits prior to your appointment.    You must be in the [facility] during the virtual visit.     Please use the [employer] Mobile Ellie and launch the video visit 10 minutes prior to your scheduled appointment time to ensure your camera and microphone are working properly. Once the video visit has started you will be placed in a waiting room until the provider begins the visit.     You will receive an email confirmation with instructions.  If you have questions, call your doctor's office directly.    If you are having issues or need to use a desktop/laptop, please follow the below steps:        1.       Close out all other open apps (could be competing for audio resources)  2.       Disable Bluetooth  3.       Reboot mobile device before joining the video  4.       Come off Wi-Fi and switch over to Data    Please see our Video Visit Tip Sheet if you need additional assistance.     If you believe this is an emergency, please dial 911 immediately.           Wednesday June 18, 2025 7:30 AM  Follow Up Visit with Humberto Hopkins MD  [place]  [facility], [address] - OB/GYN ([facility]) [address] [address] [phone]   Contact your primary care provider if your insurance requires a referral.    Please arrive 15 minutes prior to your scheduled appointment. Be sure to bring your current Insurance card, Photo ID, and medication bottles or a list of your current medications.      A 24 hour notice is required to cancel any appointment or you may be charged a $40 No Show Fee.     Important: 24 hour notice is required to cancel any appointment or you may be charged a $40 No Show Fee. Please notify your physician office.          Thursday July 03, 2025 8:30 AM  Adult Physical with Belinda Day MD  [facility] ([facility]) [address] [facility] [address]  [phone]   PLEASE NOTE - Most insurances allow a Complete Physical once every 366 days. Please schedule accordingly.    Please arrive 15 minutes prior to your scheduled appointment. Please also bring your Insurance card, Photo ID, and your medication bottles or a list of your current medication.    If you no longer require this appointment, please contact your physician office to cancel.

## 2025-05-21 ENCOUNTER — LAB ENCOUNTER (OUTPATIENT)
Dept: LAB | Age: 40
End: 2025-05-21
Attending: FAMILY MEDICINE
Payer: COMMERCIAL

## 2025-05-21 DIAGNOSIS — Z00.00 WELL ADULT EXAM: ICD-10-CM

## 2025-05-21 DIAGNOSIS — E11.9 CONTROLLED TYPE 2 DIABETES MELLITUS WITHOUT COMPLICATION, WITHOUT LONG-TERM CURRENT USE OF INSULIN (HCC): ICD-10-CM

## 2025-05-21 LAB
CHOLEST SERPL-MCNC: 147 MG/DL (ref ?–200)
CREAT UR-SCNC: 170.8 MG/DL
FASTING PATIENT LIPID ANSWER: YES
HDLC SERPL-MCNC: 50 MG/DL (ref 40–59)
LDLC SERPL CALC-MCNC: 69 MG/DL (ref ?–100)
MICROALBUMIN UR-MCNC: 20.8 MG/DL
MICROALBUMIN/CREAT 24H UR-RTO: 121.8 UG/MG (ref ?–30)
NONHDLC SERPL-MCNC: 97 MG/DL (ref ?–130)
T4 FREE SERPL-MCNC: 1.2 NG/DL (ref 0.8–1.7)
TRIGL SERPL-MCNC: 168 MG/DL (ref 30–149)
TSI SER-ACNC: 1.78 UIU/ML (ref 0.55–4.78)
VLDLC SERPL CALC-MCNC: 25 MG/DL (ref 0–30)

## 2025-05-21 PROCEDURE — 82570 ASSAY OF URINE CREATININE: CPT

## 2025-05-21 PROCEDURE — 80061 LIPID PANEL: CPT

## 2025-05-21 PROCEDURE — 84443 ASSAY THYROID STIM HORMONE: CPT

## 2025-05-21 PROCEDURE — 82043 UR ALBUMIN QUANTITATIVE: CPT

## 2025-05-21 PROCEDURE — 84439 ASSAY OF FREE THYROXINE: CPT

## 2025-05-21 PROCEDURE — 36415 COLL VENOUS BLD VENIPUNCTURE: CPT

## 2025-05-22 ENCOUNTER — TELEPHONE (OUTPATIENT)
Dept: FAMILY MEDICINE CLINIC | Facility: CLINIC | Age: 40
End: 2025-05-22

## 2025-05-22 DIAGNOSIS — E11.9 CONTROLLED TYPE 2 DIABETES MELLITUS WITHOUT COMPLICATION, WITHOUT LONG-TERM CURRENT USE OF INSULIN (HCC): ICD-10-CM

## 2025-05-22 NOTE — TELEPHONE ENCOUNTER
LOV 05/20/25  Last labs 05/20/25, 05/21/25  Last refill on 03/16/25, for #6ml, with 0 refills  semaglutide-weight management 1 MG/0.5ML Subcutaneous Solution Auto-injector     Future Appointments   Date Time Provider Department Center   6/18/2025  7:30 AM Humberto Hopkins MD [facility] OB[facility]N O [facility] [place]   7/3/2025  8:30 AM Belinda Day MD [facility]     Order(s) pending, please review. Thank you.  [facility]    Please send back to nurse pool to work on PA, thank you

## 2025-05-22 NOTE — TELEPHONE ENCOUNTER
Received fax from Dr. Robert Oppenheim of [facility] regarding patient's diabetic eye exam which was completed 5/21/2025. Report states: no retinopathy\".

## 2025-05-22 NOTE — TELEPHONE ENCOUNTER
Rx sent    ePA requested via Epic    ePA questions answered and submitted via Epic    Awaiting PA response

## 2025-05-22 NOTE — TELEPHONE ENCOUNTER
PT CALLED AND ADV NEEDS REFILL OF     semaglutide-weight management 1 MG/0.5ML Subcutaneous Solution Auto-injector     DID TALK TO PHARMACY LAST WEEK AND THEY ADV THAT PT WILL NEED A NEW PRIOR AUTHORIZATION ON MEDICATION    WAS ADV THAT PHARMACY WAS SUPPOSE TO FAX SOMETHING OVER    PLEASE START PROCESS :     [facility]    THANK YOU

## 2025-05-27 ENCOUNTER — MED REC SCAN ONLY (OUTPATIENT)
Dept: FAMILY MEDICINE CLINIC | Facility: CLINIC | Age: 40
End: 2025-05-27

## 2025-05-27 ENCOUNTER — PATIENT MESSAGE (OUTPATIENT)
Dept: FAMILY MEDICINE CLINIC | Facility: CLINIC | Age: 40
End: 2025-05-27

## 2025-05-27 NOTE — TELEPHONE ENCOUNTER
Per Dr. Cano: \"Please contact Patient and let her know where to send paper work and what she needs work note to say\"    MCM sent to patient  Notify me if not read by 05/30/25

## 2025-05-28 ENCOUNTER — TELEMEDICINE (OUTPATIENT)
Dept: FAMILY MEDICINE CLINIC | Facility: CLINIC | Age: 40
End: 2025-05-28
Payer: COMMERCIAL

## 2025-05-28 DIAGNOSIS — E66.813 CLASS 3 SEVERE OBESITY DUE TO EXCESS CALORIES WITH BODY MASS INDEX (BMI) OF 45.0 TO 49.9 IN ADULT, UNSPECIFIED WHETHER SERIOUS COMORBIDITY PRESENT: ICD-10-CM

## 2025-05-28 DIAGNOSIS — G47.30 SLEEP DISORDER BREATHING: ICD-10-CM

## 2025-05-28 DIAGNOSIS — B02.8 HERPES ZOSTER WITH COMPLICATION: Primary | ICD-10-CM

## 2025-05-28 PROCEDURE — 98005 SYNCH AUDIO-VIDEO EST LOW 20: CPT | Performed by: FAMILY MEDICINE

## 2025-05-28 RX ORDER — GABAPENTIN 100 MG/1
200 CAPSULE ORAL 3 TIMES DAILY
Qty: 60 CAPSULE | Refills: 0 | Status: SHIPPED | OUTPATIENT
Start: 2025-05-28 | End: 2025-06-07

## 2025-05-28 NOTE — PROGRESS NOTES
Telehealth outside of [facility]  Telehealth Verbal Consent   I conducted a telehealth visit with Samara Jade today, 05/28/25, which was completed using two-way, real-time interactive audio and video communication. This has been done in good james to provide continuity of care in the best interest of the provider-patient relationship, due to the COVID -19 public health crisis/national emergency where restrictions of face-to-face office visits are ongoing. Every conscious effort was taken to allow for sufficient and adequate time to complete the visit.  The patient was made aware of the limitations of the telehealth visit, including treatment limitations as no physical exam could be performed.  The patient was advised to call 911 or to go to the ER in case there was an emergency.  The patient was also advised of the potential privacy & security concerns related to the telehealth platform.   The patient was made aware of where to find [facility]'s notice of privacy practices, telehealth consent form and other related consent forms and documents.  which are located on the [facility] website. The patient verbally agreed to telehealth consent form, related consents and the risks discussed.    Lastly, the patient confirmed that they were in [place].   Included in this visit, time may have been spent reviewing labs, medications, radiology tests and decision making. Appropriate medical decision-making and tests are ordered as detailed in the plan of care above.  Coding/billing information is submitted for this visit based on complexity of care and/or time spent for the visit.    Samara Jade is a 40 year old female.    Chief Complaint   Patient presents with    Note     Needs letter for work           HPI:   Patient is here for follow up on shingles. She is still having a lot of neuropathic pain and wishes to increase her gabapentin. She has a lesion in her mouth and is not ready to go back to work at this time and wishes  to have more time off.    Patient needs next dose of wegovy for insurance to cover this    Problem List[1]  Current Medications[2]   Past Medical History[3]   Social History:  Short Social Hx on File[4]  Family History[5]     Allergies  Allergies[6]    REVIEW OF SYSTEMS:   As per HPI all other systems are negative    EXAM:   GENERAL: well developed, well nourished,in no apparent distress. Pt is speaking in full sentences without any cognitive impairment. Further physical exam could not be performed due to interview being conducted over telemedicine medium      ASSESSMENT AND PLAN:     Encounter Diagnoses   Name Primary?    Herpes zoster with complication Yes    Sleep disorder breathing     Class 3 severe obesity due to excess calories with body mass index (BMI) of 45.0 to 49.9 in adult, unspecified whether serious comorbidity present        No orders of the defined types were placed in this encounter.      Meds & Refills for this Visit:  Requested Prescriptions     Signed Prescriptions Disp Refills    gabapentin 100 MG Oral Cap 60 capsule 0     Sig: Take 2 capsules (200 mg total) by mouth 3 (three) times daily for 10 days.    semaglutide-weight management 1.7 MG/0.75ML Subcutaneous Solution Auto-injector 6 mL 0     Sig: Inject 0.75 mL (1.7 mg total) into the skin once a week for 8 doses.       Imaging & Consults:  NEURO - INTERNAL  Patient given time off to see neurology and have specialist clear her to return to work      No follow-ups on file.  There are no Patient Instructions on file for this visit.      This note was created by [employer] voice recognition. Errors in content may be related to improper recognition by the system; efforts to review and correct have been done but errors may still exist. Please be advised the primary purpose of this note is for me to communicate medical care. Standard sentence structure is not always used. Medical terminology and medical abbreviations may be used. There may be  grammatical, typographical, and automated fill ins that may have errors missed in proofreading.          [1] There is no problem list on file for this patient.   [2]   Current Outpatient Medications   Medication Sig Dispense Refill    gabapentin 100 MG Oral Cap Take 2 capsules (200 mg total) by mouth 3 (three) times daily for 10 days. 60 capsule 0    semaglutide-weight management 1.7 MG/0.75ML Subcutaneous Solution Auto-injector Inject 0.75 mL (1.7 mg total) into the skin once a week for 8 doses. 6 mL 0    metFORMIN  MG Oral Tablet 24 Hr Take 1 tablet (500 mg total) by mouth daily. 90 tablet 0    methylPREDNISolone (MEDROL) 4 MG Oral Tablet Therapy Pack Dosepack: take as directed 1 each 0    Lidocaine Viscous HCl 2 % Mouth/Throat Solution Take 5 mL by mouth every 3 (three) hours as needed for Pain. 60 mL 0    chlorthalidone 25 MG Oral Tab Take 1 tablet (25 mg total) by mouth daily. 90 tablet 1    atorvastatin 20 MG Oral Tab Take 1 tablet (20 mg total) by mouth daily. 90 tablet 0    lisinopril 30 MG Oral Tab Take 1 tablet (30 mg total) by mouth daily. 90 tablet 0    Blood Pressure Does not apply Kit Use daily as needed 1 kit 0   [3]   Past Medical History:   Hyperlipidemia    Hypertension   [4]   Social History  Socioeconomic History    Marital status:    Tobacco Use    Smoking status: Former     Types: Cigarettes     Passive exposure: Never    Smokeless tobacco: Never    Tobacco comments:     Quit 10 years ago   Vaping Use    Vaping status: Former   Substance and Sexual Activity    Alcohol use: Not Currently     Comment: socially    Drug use: Never    Sexual activity: Yes     Partners: Female     Birth control/protection: I.U.D.   Other Topics Concern    Caffeine Concern Yes    Exercise Yes    Seat Belt Yes   [5]   Family History  Problem Relation Age of Onset    Other (cervical cancer) Mother     Breast Cancer Other    [6]   Allergies  Allergen Reactions    Sulfa Antibiotics OTHER (SEE COMMENTS)     Pt  stated her mother had told her this when she was young and is unaware if this allergy is true

## 2025-05-28 NOTE — TELEPHONE ENCOUNTER
Per Dr. Cano: \"Wrt to her shingles pain can she do a VIDEO VISIT with me today to address return to work date?\" \"Needs VIDEO VISIT to discuss\"    Advised patient of Dr. Cano's note above. Patient verbalized understanding and agreed to schedule appt. Appt made. No further questions at this time.    Future Appointments   Date Time Provider Department Center   5/28/2025  2:00 PM Belinda Day MD [facility] [place]   6/18/2025  7:30 AM Humberto Hopkins MD [facility] OB[facility]GYN O [facility] [place]   7/3/2025  8:30 AM Belinda Day MD [facility] [place]

## 2025-06-10 ENCOUNTER — TELEPHONE (OUTPATIENT)
Dept: FAMILY MEDICINE CLINIC | Facility: CLINIC | Age: 40
End: 2025-06-10

## 2025-06-10 ENCOUNTER — TELEMEDICINE (OUTPATIENT)
Dept: FAMILY MEDICINE CLINIC | Facility: CLINIC | Age: 40
End: 2025-06-10
Payer: COMMERCIAL

## 2025-06-10 DIAGNOSIS — B02.9 HERPES ZOSTER WITHOUT COMPLICATION: Primary | ICD-10-CM

## 2025-06-10 DIAGNOSIS — E11.9 CONTROLLED TYPE 2 DIABETES MELLITUS WITHOUT COMPLICATION, WITHOUT LONG-TERM CURRENT USE OF INSULIN (HCC): ICD-10-CM

## 2025-06-10 DIAGNOSIS — I10 BENIGN ESSENTIAL HTN: ICD-10-CM

## 2025-06-10 PROCEDURE — 98005 SYNCH AUDIO-VIDEO EST LOW 20: CPT | Performed by: FAMILY MEDICINE

## 2025-06-10 RX ORDER — GABAPENTIN 100 MG/1
100 CAPSULE ORAL 3 TIMES DAILY
Qty: 90 CAPSULE | Refills: 0 | Status: SHIPPED | OUTPATIENT
Start: 2025-06-10

## 2025-06-10 RX ORDER — LISINOPRIL 30 MG/1
30 TABLET ORAL DAILY
Qty: 90 TABLET | Refills: 0 | Status: SHIPPED | OUTPATIENT
Start: 2025-06-10

## 2025-06-10 NOTE — PROGRESS NOTES
Telehealth outside of [facility]  Telehealth Verbal Consent   I conducted a telehealth visit with Samara Jade today, 06/10/25, which was completed using two-way, real-time interactive audio and video communication. This has been done in good james to provide continuity of care in the best interest of the provider-patient relationship, due to the COVID -19 public health crisis/national emergency where restrictions of face-to-face office visits are ongoing. Every conscious effort was taken to allow for sufficient and adequate time to complete the visit.  The patient was made aware of the limitations of the telehealth visit, including treatment limitations as no physical exam could be performed.  The patient was advised to call 911 or to go to the ER in case there was an emergency.  The patient was also advised of the potential privacy & security concerns related to the telehealth platform.   The patient was made aware of where to find [facility]'s notice of privacy practices, telehealth consent form and other related consent forms and documents.  which are located on the [facility] website. The patient verbally agreed to telehealth consent form, related consents and the risks discussed.    Lastly, the patient confirmed that they were in [place].   Included in this visit, time may have been spent reviewing labs, medications, radiology tests and decision making. Appropriate medical decision-making and tests are ordered as detailed in the plan of care above.  Coding/billing information is submitted for this visit based on complexity of care and/or time spent for the visit.    Samara Jade is a 40 year old female.    Chief Complaint   Patient presents with    Pain       HPI:   Patient is here for pain management for her shingles. She is feeling a bit better and has run out of her gabapentin  but stated that the 200 mg of gabapentin was causing drowsiness. Having said that she is feeling somewhat better but has occasional  \"zaps\" she agrees to go back on lower dose of gabapentin until able to see neurologist and feel she is able to go back to work next week    Patient also requests refill of her lisinopril        Problem List[1]  Current Medications[2]   Past Medical History[3]   Social History:  Short Social Hx on File[4]  Family History[5]     Allergies  Allergies[6]    REVIEW OF SYSTEMS:   As per HPI all other systems are negative    EXAM:   GENERAL: well developed, well nourished,in no apparent distress. Pt is speaking in full sentences without any cognitive impairment. Further physical exam could not be performed due to interview being conducted over telemedicine medium      ASSESSMENT AND PLAN:     Encounter Diagnoses   Name Primary?    Herpes zoster without complication Yes    Controlled type 2 diabetes mellitus without complication, without long-term current use of insulin (HCC)     Benign essential HTN        No orders of the defined types were placed in this encounter.      Meds & Refills for this Visit:  Requested Prescriptions     Signed Prescriptions Disp Refills    gabapentin 100 MG Oral Cap 90 capsule 0     Sig: Take 1 capsule (100 mg total) by mouth 3 (three) times daily.    lisinopril 30 MG Oral Tab 90 tablet 0     Sig: Take 1 tablet (30 mg total) by mouth daily.       Imaging & Consults:  None    ICD-10-CM    1. Herpes zoster without complication  B02.9 gabapentin 100 MG Oral Cap      2. Controlled type 2 diabetes mellitus without complication, without long-term current use of insulin (HCC)  E11.9 lisinopril 30 MG Oral Tab      3. Benign essential HTN  I10 lisinopril 30 MG Oral Tab          No follow-ups on file.  There are no Patient Instructions on file for this visit.      This note was created by [employer] voice recognition. Errors in content may be related to improper recognition by the system; efforts to review and correct have been done but errors may still exist. Please be advised the primary purpose of this  note is for me to communicate medical care. Standard sentence structure is not always used. Medical terminology and medical abbreviations may be used. There may be grammatical, typographical, and automated fill ins that may have errors missed in proofreading.          [1] There is no problem list on file for this patient.  [2]   Current Outpatient Medications   Medication Sig Dispense Refill    gabapentin 100 MG Oral Cap Take 1 capsule (100 mg total) by mouth 3 (three) times daily. 90 capsule 0    lisinopril 30 MG Oral Tab Take 1 tablet (30 mg total) by mouth daily. 90 tablet 0    semaglutide-weight management 1.7 MG/0.75ML Subcutaneous Solution Auto-injector Inject 0.75 mL (1.7 mg total) into the skin once a week for 8 doses. 6 mL 0    metFORMIN  MG Oral Tablet 24 Hr Take 1 tablet (500 mg total) by mouth daily. 90 tablet 0    Lidocaine Viscous HCl 2 % Mouth/Throat Solution Take 5 mL by mouth every 3 (three) hours as needed for Pain. 60 mL 0    chlorthalidone 25 MG Oral Tab Take 1 tablet (25 mg total) by mouth daily. 90 tablet 1    atorvastatin 20 MG Oral Tab Take 1 tablet (20 mg total) by mouth daily. 90 tablet 0    Blood Pressure Does not apply Kit Use daily as needed 1 kit 0   [3]   Past Medical History:   Hyperlipidemia    Hypertension   [4]   Social History  Socioeconomic History    Marital status:    Tobacco Use    Smoking status: Former     Types: Cigarettes     Passive exposure: Never    Smokeless tobacco: Never    Tobacco comments:     Quit 10 years ago   Vaping Use    Vaping status: Former   Substance and Sexual Activity    Alcohol use: Not Currently     Comment: socially    Drug use: Never    Sexual activity: Yes     Partners: Female     Birth control/protection: I.U.D.   Other Topics Concern    Caffeine Concern Yes    Exercise Yes    Seat Belt Yes   [5]   Family History  Problem Relation Age of Onset    Other (cervical cancer) Mother     Breast Cancer Other    [6]   Allergies  Allergen  Reactions    Sulfa Antibiotics OTHER (SEE COMMENTS)     Pt stated her mother had told her this when she was young and is unaware if this allergy is true

## 2025-06-10 NOTE — TELEPHONE ENCOUNTER
Per Dr. Cano: \"Ok for VIDEO VISIT\"    Advised patient of Dr. Cano's note above. Patient verbalized understanding. No further questions at this time.    Future Appointments   Date Time Provider Department Center   6/10/2025  1:45 PM Belinda Day MD [facility] [place]   6/18/2025  7:30 AM Humberto Hopkins MD [facility] OB[facility]N O [facility] [place]   7/2/2025  2[facility]40 [facility] [place]   7/3/2025  8:30 AM Belinda Day MD [facility] [place]

## 2025-06-10 NOTE — TELEPHONE ENCOUNTER
PT CALLED AND ADV HAS APT TODAY AT 1;45     PT ADV PHYSICALLY CAN NOT BE IN OFFICE DUE TO SOMETHING COMING UP WITH ONE OF HER KIDS.    OFFERED VIDEO VISIT AND THEN ADV NEXT AVAILABLE APT WOULDN'T BE UNTIL 7/3/25 - THAT'S WHEN PT HAS ANNUAL PX.    IS THIS OK TO WAIT, OR WOULD IT BE OK FOR A VIDEO VISIT    PLEASE ADV    THANK YOU

## 2025-06-17 DIAGNOSIS — E78.2 MIXED HYPERLIPIDEMIA: ICD-10-CM

## 2025-06-17 DIAGNOSIS — E11.9 CONTROLLED TYPE 2 DIABETES MELLITUS WITHOUT COMPLICATION, WITHOUT LONG-TERM CURRENT USE OF INSULIN (HCC): ICD-10-CM

## 2025-06-18 RX ORDER — ATORVASTATIN CALCIUM 20 MG/1
20 TABLET, FILM COATED ORAL DAILY
Qty: 90 TABLET | Refills: 0 | Status: SHIPPED | OUTPATIENT
Start: 2025-06-18

## 2025-06-18 RX ORDER — CHLORTHALIDONE 25 MG/1
25 TABLET ORAL DAILY
Qty: 90 TABLET | Refills: 1 | OUTPATIENT
Start: 2025-06-18

## 2025-07-02 ENCOUNTER — OFFICE VISIT (OUTPATIENT)
Dept: NEUROLOGY | Facility: CLINIC | Age: 40
End: 2025-07-02
Payer: COMMERCIAL

## 2025-07-02 VITALS
HEART RATE: 88 BPM | HEIGHT: 66.5 IN | DIASTOLIC BLOOD PRESSURE: 78 MMHG | BODY MASS INDEX: 45.9 KG/M2 | SYSTOLIC BLOOD PRESSURE: 116 MMHG | RESPIRATION RATE: 16 BRPM | WEIGHT: 289 LBS

## 2025-07-02 DIAGNOSIS — B02.22 TRIGEMINAL HERPES ZOSTER: Primary | ICD-10-CM

## 2025-07-02 PROCEDURE — 3078F DIAST BP <80 MM HG: CPT | Performed by: OTHER

## 2025-07-02 PROCEDURE — 3008F BODY MASS INDEX DOCD: CPT | Performed by: OTHER

## 2025-07-02 PROCEDURE — 3074F SYST BP LT 130 MM HG: CPT | Performed by: OTHER

## 2025-07-02 PROCEDURE — 99204 OFFICE O/P NEW MOD 45 MIN: CPT | Performed by: OTHER

## 2025-07-02 NOTE — PATIENT INSTRUCTIONS
Refill policies:    Allow 2-3 business days for refills; controlled substances may take longer.  Contact your pharmacy at least 5 days prior to running out of medication and have them send an electronic request or submit request through the “request refill” option in your [employer] account.  Refills are not addressed on weekends; covering physicians do not authorize routine medications on weekends.  No narcotics or controlled substances are refilled after noon on Fridays or by on call physicians.  By law, narcotics must be electronically prescribed.  A 30 day supply with no refills is the maximum allowed.  If your prescription is due for a refill, you may be due for a follow up appointment.  To best provide you care, patients receiving routine medications need to be seen at least once a year.  Patients receiving narcotic/controlled substance medications need to be seen at least once every 3 months.  In the event that your preferred pharmacy does not have the requested medication in stock (e.g. Backordered), it is your responsibility to find another pharmacy that has the requested medication available.  We will gladly send a new prescription to that pharmacy at your request.    Scheduling Tests:    If your physician has ordered radiology tests such as MRI or CT scans, please contact Central Scheduling at [phone] right away to schedule the test.  Once scheduled, the [facility] Centralized Referral Team will work with your insurance carrier to obtain pre-certification or prior authorization.  Depending on your insurance carrier, approval may take 3-10 days.  It is highly recommended patients assure they have received an authorization before having a test performed.  If test is done without insurance authorization, patient may be responsible for the entire amount billed.      Precertification and Prior Authorizations:  If your physician has recommended that you have a procedure or additional testing performed the [facility]  Centralized Referral Team will contact your insurance carrier to obtain pre-certification or prior authorization.    You are strongly encouraged to contact your insurance carrier to verify that your procedure/test has been approved and is a COVERED benefit.  Although the [facility] Centralized Referral Team does its due diligence, the insurance carrier gives the disclaimer that \"Although the procedure is authorized, this does not guarantee payment.\"    Ultimately the patient is responsible for payment.   Thank you for your understanding in this matter.  Paperwork Completion:  If you require FMLA or disability paperwork for your recovery, please make sure to either drop it off or have it faxed to our office at [phone]. Be sure the form has your name and date of birth on it.  The form will be faxed to our Forms Department and they will complete it for you.  There is a 25$ fee for all forms that need to be filled out.  Please be aware there is a 10-14 day turnaround time.  You will need to sign a release of information (FIOR) form if your paperwork does not come with one.  You may call the Forms Department with any questions at [phone].  Their fax number is [phone].

## 2025-07-02 NOTE — H&P
[facility] New Patient / Consult Visit    Samara Jade is a 40 year old female.                         Referring MD: Belinda Day    Chief Complaint   Patient presents with    Pain     Patient ha\d shingles in the mouth and has intermittent pain       HPI:    Samara Jade is a 40 year old, who presents for evaluation of post shingles pain. She had new onset of pain in the right side of the throat, 5/15/2025, and then noted sores on the right side of the lip and on the nose and had sensitivity and tingling pain in the R side of the head over scalp and face and under the eye and down to jaw. She was treated with steroids and valacyclovir; for nerve pain, she was tried on gabapentin initially but she was feeling drowsy on the dose and was only on 100 mg tid maximum.  She noted improvement in the pain ~ 3 weeks ago and now only has intermittent zapping sensation over the right side of the scalp; she did not have lesions near eye or vision changes and did not have lesions on ear or hearing changes. She denies prior history of shingles but had chicken pox as child.      .  Otherwise, patient denies any recent weight change, fevers, chills, nausea, double vision/ blurry vision / loss of vision, chest pain, palpitations, shortness of breath, rashes, joint pains, bowel / bladder incontinence or mood issues.     Past Medical History[1]  Past Surgical History[2]  Short Social Hx on File[3]  Family History[4]    Allergies:  Allergies[5]   Current Meds:  Current Medications[6]       ROS:   A comprehensive 10 point review of systems was completed.  Pertinent positives and negatives noted in the the HPI.      PHYSICAL EXAM:   /78 (BP Location: Right arm, Patient Position: Sitting, Cuff Size: large)   Pulse 88   Resp 16   Ht 66.5\"   Wt 289 lb (131.1 kg)   LMP 06/11/2025 (Exact Date)   BMI 45.95 kg/m²   Estimated body mass index is 45.95 kg/m² as calculated from the  following:    Height as of this encounter: 66.5\".    Weight as of this encounter: 289 lb (131.1 kg).    GENERAL: well developed, well nourished, in no apparent distress  SKIN: no rashes  EYES: sclera anicteric, conjunctiva normal  HEENT: normocephalic  CARDIOVASCULAR: S1, S2 normal, RRR  LUNGS: clear to auscultation bilaterally  EXTREMITIES: no cyanosis, peripheral pulses intact    Neck: Supple; full range of motion; no carotid bruits    Mental status:  Alert and oriented to time, place, person, and situation  Speech: fluent  Language: normal naming, repetition, and comprehension  Memory: normal  Attention/concentration: normal    Fundoscopic Exam: optic discs sharp bilaterally    Cranial Nerves: II through XII  Optic:    Pupils: equally round and reactive to light with direct and consensual responses, normal accomodation   Visual acuity: Normal              Visual fields: Normal  Oculomotor/Trochlear/Abducens:    Eye Movements: EOMI without nystagmus  Trigeminal:   Facial sensation:intact to light touch bilaterally  Facial:   Smile symmetric, eyebrow raise symmetric  Vestibulocochlear:   Hearing: normal bilaterally  Glossopharyngeal/Vagus:   Palate elevates symmetrically with midline uvula  Spinal accessory:   Shoulder Shrug: normal bilaterally   Lateral head turn: normal bilaterally  Hypoglossal:   Tongue movement: protrusion is midline with normal lateral movements    Motor System:  Strength: 5/5 throughout  Tone: normal    Sensory:  Pin is normal  Vibration is normal  Proprioception is normal  Romberg is absent    Coordination:  Finger to nose normal bilaterally  Rapid alternating movements normal bilaterally  Heel to shin is normal bilaterally    DTRs:   2+, symmetric, throughout, toes downgoing bilaterally; no clonus          Gait:  Normal casual, heel, toe and tandem gait    TEST RESULTS/DATA REVIEWED:   Labs, imaging and notes reviewed in EMR    IMPRESSION AND PLAN:   Samara Jade is a 40 year old  alyssia presents for evaluation of post shingles pain. She had new onset of pain in the right side of the throat, 5/15/2025, and then noted sores on the right side of the lip and on the nose and had sensitivity and tingling pain in the R side of the head over scalp and face and under the eye and down to jaw. She was treated with steroids and valacyclovir; for nerve pain, she was tried on gabapentin initially but she was feeling drowsy on the dose and was only on 100 mg tid maximum.  She noted improvement in the pain ~ 3 weeks ago and now only has intermittent zapping sensation over the right side of the scalp; she did not have lesions near eye or vision changes and did not have lesions on ear or hearing changes. She denies prior history of shingles but had chicken pox as child.     Overall, currently neurologic examination appears normal and nonfocal.  She expresses previous symptoms consistent with herpetic neuralgia, in the trigeminal nerve distribution, although somewhat atypical with lesions inside the mouth rather than outside the mouth.  This is still in the distribution of trigeminal nerve, however.  She previously had symptoms of neuralgia, but these appear to have largely resolved now only having very mild intermittent paresthesias in the trigeminal nerve distribution on the right side.  These are minor, however and patient denies significant pain and does not wish to be on neuropathic pain medications.  As such, I recommend watchful waiting and patient was advised that she does not appear to have any permanent neurologic deficits related to her herpes zoster infection.     1. Trigeminal herpes zoster  As noted above     Copy of note was sent to referring physician.      60 total minutes spent, including chart review, discussion of pertinent labs and imaging with patient and family with discussion / plan of care as noted above; patient and family allowed to ask questions and all questions answered to the  best of my ability     No follow-ups on file.    Seferino Goff MD, Neurology  [facility]  Pager [phone]  7/2/2025               [1]   Past Medical History:   Diabetes (HCC)    Hyperlipidemia    Hypertension   [2]   Past Surgical History:  Procedure Laterality Date    Tonsillectomy     [3]   Social History  Socioeconomic History    Marital status:    Tobacco Use    Smoking status: Former     Types: Cigarettes     Passive exposure: Never    Smokeless tobacco: Never    Tobacco comments:     Quit 10 years ago   Vaping Use    Vaping status: Never Used   Substance and Sexual Activity    Alcohol use: Yes     Comment: occasionally    Drug use: Never    Sexual activity: Yes     Partners: Female     Birth control/protection: I.U.D.   Other Topics Concern    Caffeine Concern Yes     Comment: 1 occasionally    Exercise No    Seat Belt Yes   [4]   Family History  Problem Relation Age of Onset    Hypertension Father     Other (cervical cancer) Mother     Ovarian Cancer Mother     Kidney Disease Mother     Breast Cancer Other    [5]   Allergies  Allergen Reactions    Sulfa Antibiotics OTHER (SEE COMMENTS)     Pt stated her mother had told her this when she was young and is unaware if this allergy is true   [6]   Current Outpatient Medications   Medication Sig Dispense Refill    atorvastatin 20 MG Oral Tab Take 1 tablet (20 mg total) by mouth daily. 90 tablet 0    lisinopril 30 MG Oral Tab Take 1 tablet (30 mg total) by mouth daily. 90 tablet 0    metFORMIN  MG Oral Tablet 24 Hr Take 1 tablet (500 mg total) by mouth daily. 90 tablet 0    chlorthalidone 25 MG Oral Tab Take 1 tablet (25 mg total) by mouth daily. 90 tablet 1    Blood Pressure Does not apply Kit Use daily as needed 1 kit 0    semaglutide-weight management 1.7 MG/0.75ML Subcutaneous Solution Auto-injector Inject 0.75 mL (1.7 mg total) into the skin once a week for 4 doses. 3 mL 0

## 2025-07-03 ENCOUNTER — OFFICE VISIT (OUTPATIENT)
Dept: FAMILY MEDICINE CLINIC | Facility: CLINIC | Age: 40
End: 2025-07-03
Payer: COMMERCIAL

## 2025-07-03 VITALS
SYSTOLIC BLOOD PRESSURE: 116 MMHG | TEMPERATURE: 98 F | DIASTOLIC BLOOD PRESSURE: 76 MMHG | HEART RATE: 98 BPM | WEIGHT: 289 LBS | RESPIRATION RATE: 18 BRPM | BODY MASS INDEX: 46 KG/M2 | OXYGEN SATURATION: 98 %

## 2025-07-03 DIAGNOSIS — E11.9 CONTROLLED TYPE 2 DIABETES MELLITUS WITHOUT COMPLICATION, WITHOUT LONG-TERM CURRENT USE OF INSULIN (HCC): ICD-10-CM

## 2025-07-03 DIAGNOSIS — N28.9 NEPHROPATHY: ICD-10-CM

## 2025-07-03 DIAGNOSIS — E66.813 CLASS 3 SEVERE OBESITY DUE TO EXCESS CALORIES WITH BODY MASS INDEX (BMI) OF 45.0 TO 49.9 IN ADULT, UNSPECIFIED WHETHER SERIOUS COMORBIDITY PRESENT: ICD-10-CM

## 2025-07-03 DIAGNOSIS — L98.9 SCALP LESION: ICD-10-CM

## 2025-07-03 DIAGNOSIS — Z00.00 WELL ADULT EXAM: Primary | ICD-10-CM

## 2025-07-03 DIAGNOSIS — Z23 NEED FOR PNEUMOCOCCAL 20-VALENT CONJUGATE VACCINATION: ICD-10-CM

## 2025-07-03 DIAGNOSIS — G47.30 SLEEP DISORDER BREATHING: ICD-10-CM

## 2025-07-03 PROCEDURE — 99395 PREV VISIT EST AGE 18-39: CPT | Performed by: FAMILY MEDICINE

## 2025-07-03 PROCEDURE — 3074F SYST BP LT 130 MM HG: CPT | Performed by: FAMILY MEDICINE

## 2025-07-03 PROCEDURE — 3060F POS MICROALBUMINURIA REV: CPT | Performed by: FAMILY MEDICINE

## 2025-07-03 PROCEDURE — 3078F DIAST BP <80 MM HG: CPT | Performed by: FAMILY MEDICINE

## 2025-07-03 PROCEDURE — 99213 OFFICE O/P EST LOW 20 MIN: CPT | Performed by: FAMILY MEDICINE

## 2025-07-03 PROCEDURE — 90677 PCV20 VACCINE IM: CPT | Performed by: FAMILY MEDICINE

## 2025-07-03 PROCEDURE — 90471 IMMUNIZATION ADMIN: CPT | Performed by: FAMILY MEDICINE

## 2025-07-03 PROCEDURE — 3061F NEG MICROALBUMINURIA REV: CPT | Performed by: FAMILY MEDICINE

## 2025-07-03 RX ORDER — CHLORTHALIDONE 25 MG/1
25 TABLET ORAL DAILY
Qty: 90 TABLET | Refills: 1 | OUTPATIENT
Start: 2025-07-03

## 2025-07-03 NOTE — PROGRESS NOTES
The following individual(s) verbally consented to be recorded using ambient AI listening technology and understand that they can each withdraw their consent to this listening technology at any point by asking the clinician to turn off or pause the recording:    Patient name: Samara Jade  Additional names:  none        Chief Complaint   Patient presents with    Well Adult    Physical       History of Present Illness  Samara Jade is a 40 year old female who presents for an annual physical exam.    She is managing her blood sugar levels and has not completed blood work or urine tests in a few weeks. She is currently on semaglutide (Wegovy) at a dose of 1.7 mg, which she has been taking for about six weeks, resulting in weight loss from 314 pounds to 289 pounds. She experiences heartburn and indigestion, which she manages with over-the-counter medication.    She has a history of proteinuria and has previously consulted with a nephrologist. A follow-up appointment was canceled, and she has not rescheduled. Her attempt to refill her water pill prescription was denied, and she has not contacted the nephrologist's office to follow up.    For weight management, she goes to the gym three times a week, focusing on low-impact cardio and light weight training. She uses the treadmill and bike, adjusting between different types of bikes, and has been increasing her protein intake with a clear whey protein supplement throughout the day.    She noticed a red spot on her scalp about seven months ago, initially thought to be a zit, which has grown in size over the past month. She has not sought treatment for it yet.    She had a pap smear in November and met with her OB/GYN, during which her IUD was removed. She reports that everything is fine in that regard.      Past Medical History[1]    Past Surgical History[2]    Social Hx on file[3]    Family History[4]     Medications Ordered Prior to  Encounter[5]      Objective  Vitals:    07/03/25 0839   BP: 116/76   Pulse: 98   Resp: 18   Temp: 97.7 °F (36.5 °C)   TempSrc: Temporal   SpO2: 98%   Weight: 289 lb (131.1 kg)         Body mass index is 45.95 kg/m².    Physical Exam  Constitutional:       Appearance: Normal appearance.   HEENT:      Head: Normocephalic and atraumatic.      Eyes: PERRLA no notable nystagmus     Ears: normal on observation     Nose: Nose normal.      Mouth: Mucous membranes are moist.      Neck: no masses no bruit  Cardiovascular:      Rate and Rhythm: Normal rate and regular rhythm.   Pulmonary:      Effort: Pulmonary effort is normal.      Breath sounds: Normal breath sounds.   Abdominal:      General: Bowel sounds are normal.      Palpations: Abdomen is soft. There is no mass.   Musculoskeletal:         General: Normal range of motion.      Cervical back: Normal range of motion.   Skin:     General: Skin is warm and dry. Small raised erythematous lesion on top of scalp that has slightly rolled edges and a small scab at the periphery  Neurological:      General: No focal deficit present.      Mental Status: She is alert and oriented to person, place, and time.   Psychiatric:         Mood and Affect: Mood normal.         Thought Content: Thought content normal.       Assessment and Plan  Assessment & Plan  Type 2 Diabetes Mellitus  Progressing towards prediabetes with elevated blood glucose and proteinuria, suggesting possible diabetic nephropathy. Weight reduced from 314 lbs to 289 lbs on Semaglutide 1.7 mg. Heartburn and indigestion likely due to delayed gastric emptying from Semaglutide, managed with OTC medication. Engages in regular physical activity and dietary changes.  - Refill Semaglutide 1.7 mg for four weeks.  - Increase Semaglutide to 2.4 mg after 12 weeks.  - Advise 1800 calorie diet with increased protein.  - Schedule nurse visit for weight check in four weeks.  - Plan follow-up in four months to reassess  A1c.    Proteinuria  Proteinuria may indicate diabetic nephropathy. Has not followed up with nephrologist due to scheduling issues.  - Advise follow-up with nephrologist.    Scalp Lesion  Red spot on scalp increased in size, possible early basal cell carcinoma. Requires dermatology evaluation and possible removal.  - Refer to dermatologist Dr. Augustin for evaluation and removal.    General Health Maintenance  Due for pneumonia vaccination due to diabetes.  - Administer Prevnar 20 vaccine.      ICD-10-CM    1. Well adult exam  Z00.00       2. Nephropathy  N28.9       3. Need for pneumococcal 20-valent conjugate vaccination  Z23 Prevnar 20 (PCV20) [76788]      4. Controlled type 2 diabetes mellitus without complication, without long-term current use of insulin (HCC)  E11.9       5. Sleep disorder breathing  G47.30 semaglutide-weight management 1.7 MG/0.75ML Subcutaneous Solution Auto-injector      6. Class 3 severe obesity due to excess calories with body mass index (BMI) of 45.0 to 49.9 in adult, unspecified whether serious comorbidity present  E66.813 semaglutide-weight management 1.7 MG/0.75ML Subcutaneous Solution Auto-injector    Z68.42       7. Scalp lesion  L98.9 Derm Referral - In Network              Follow up  No follow-ups on file.      Patient Instructions  There are no Patient Instructions on file for this visit.       Belinda Day MD       This note was created by Dragon voice recognition and or [employer] transcription service. Errors in content may be related to improper recognition by the system; efforts to review and correct have been done but errors may still exist. Please be advised the primary purpose of this note is for me to communicate medical care. Standard sentence structure is not always used. Medical terminology and medical abbreviations may be used. There may be grammatical, typographical, and automated fill ins that may have errors missed in proofreading.        [1]   Past  Medical History:   Diabetes (HCC)    Hyperlipidemia    Hypertension   [2]   Past Surgical History:  Procedure Laterality Date    Tonsillectomy     [3]   Social History  Socioeconomic History    Marital status:    Tobacco Use    Smoking status: Former     Types: Cigarettes     Passive exposure: Never    Smokeless tobacco: Never    Tobacco comments:     Quit 10 years ago   Vaping Use    Vaping status: Never Used   Substance and Sexual Activity    Alcohol use: Yes     Comment: occasionally    Drug use: Never    Sexual activity: Yes     Partners: Female     Birth control/protection: I.U.D.   Other Topics Concern    Caffeine Concern Yes     Comment: 1 occasionally    Exercise No    Seat Belt Yes   [4]   Family History  Problem Relation Age of Onset    Hypertension Father     Other (cervical cancer) Mother     Ovarian Cancer Mother     Kidney Disease Mother     Breast Cancer Other    [5]   Current Outpatient Medications on File Prior to Visit   Medication Sig Dispense Refill    atorvastatin 20 MG Oral Tab Take 1 tablet (20 mg total) by mouth daily. 90 tablet 0    lisinopril 30 MG Oral Tab Take 1 tablet (30 mg total) by mouth daily. 90 tablet 0    metFORMIN  MG Oral Tablet 24 Hr Take 1 tablet (500 mg total) by mouth daily. 90 tablet 0    chlorthalidone 25 MG Oral Tab Take 1 tablet (25 mg total) by mouth daily. 90 tablet 1    Blood Pressure Does not apply Kit Use daily as needed 1 kit 0     No current facility-administered medications on file prior to visit.

## 2025-07-31 ENCOUNTER — LAB REQUISITION (OUTPATIENT)
Dept: LAB | Facility: HOSPITAL | Age: 40
End: 2025-07-31

## 2025-07-31 DIAGNOSIS — D48.5 NEOPLASM OF UNCERTAIN BEHAVIOR OF SKIN: ICD-10-CM

## 2025-07-31 PROCEDURE — 88305 TISSUE EXAM BY PATHOLOGIST: CPT

## 2025-08-20 DIAGNOSIS — E11.9 CONTROLLED TYPE 2 DIABETES MELLITUS WITHOUT COMPLICATION, WITHOUT LONG-TERM CURRENT USE OF INSULIN (HCC): ICD-10-CM

## 2025-08-21 RX ORDER — METFORMIN HYDROCHLORIDE 500 MG/1
500 TABLET, EXTENDED RELEASE ORAL DAILY
Qty: 90 TABLET | Refills: 0 | Status: SHIPPED | OUTPATIENT
Start: 2025-08-21

## (undated) NOTE — LETTER
Date: 5/28/2025    Patient Name: Samara Jade          To Whom it may concern:    This letter has been written at the patient's request. The above patient was seen at [facility] for treatment of a medical condition.    This patient should be excused from attending work from 5/15/25 through TBD.    The patient may return to work after clearance from neurology.        Sincerely,      Belinda Day MD

## (undated) NOTE — LETTER
Date: 5/15/2025    Patient Name: Samara Jade          To Whom it may concern:     The above patient was seen at [facility] for treatment of a medical condition. Please excuse her absences this week. She can return to work once she is feeling better and is fever free for 24hrs without the use of fever-reducing medications.        Sincerely,    Maurice Jerry NP

## (undated) NOTE — LETTER
Date & Time: 5/17/2025, 8:33 AM  Patient: Samara Jade  Encounter Provider(s):    Karli Barfield APRN       To Whom It May Concern:    Samara Jade was seen and treated in our department on 5/17/2025. She can return to work 5/21/25.    If you have any questions or concerns, please do not hesitate to call.        _____________________________  Physician/APC Signature

## (undated) NOTE — LETTER
Date: 11/28/2023    Patient Name: Jatin Marcus          To Whom it may concern: The above patient was seen at the [facility] for treatment of a medical condition. This patient should be excused from attending work/school from 11/28/23 through 11/29/23. The patient may return to work/school on 11/30/23.         Sincerely,    SUPA Enriquez

## (undated) NOTE — LETTER
Samara Jade, :1985    CONSENT FOR PROCEDURE/SEDATION    1. I authorize the performance upon Samara Jade  the following: Intrauterine Device Removal    2. I authorize Dr. Humberto Hopkins MD (and whomever is designated as the doctor’s assistant), to perform the above-mentioned procedures.    3. If any unforeseen conditions arise during this procedure calling for additional  procedures, operations, or medications (including anesthesia and blood transfusion), I further request and authorize the doctor to do whatever he/she deems advisable in my interest.    4. I consent to the taking and reproduction of any photographs in the course of this procedure for professional purposes.    5. I consent to the administration of such sedation as may be considered necessary or advisable by the physician responsible for this service, with the exception of ______________________________________________________    6. I have been informed by my doctor of the nature and purpose of this procedure sedation, possible alternative methods of treatment, risk involved and possible complications.    7. If I have a Do Not Resuscitate (DNR) order in place, the physician and I (or the individual authorized to consent on my behalf) will discuss and agree as to whether the Do Not Resuscitate (DNR) order will remain in effect or will be discontinued during the performance of the procedure and the applicable recovery period. If the Do Not Resuscitate (DNR) order is discontinued and is to be reinstated following the procedure/recovery period, the physician will determine when the applicable recovery period ends for purposes of reinstating the Do Not Resuscitate (DNR) order.    Signature of Patient:_______________________________________________    Signature of person authorized to consent for patient:  _______________________________________________________________    Relationship to patient:  ____________________________________________    Witness: _________________________________________ Date:___________     Physician Signature: _______________________________ Date:___________